# Patient Record
Sex: MALE | Race: WHITE | NOT HISPANIC OR LATINO | Employment: OTHER | URBAN - METROPOLITAN AREA
[De-identification: names, ages, dates, MRNs, and addresses within clinical notes are randomized per-mention and may not be internally consistent; named-entity substitution may affect disease eponyms.]

---

## 2017-01-11 ENCOUNTER — HOSPITAL ENCOUNTER (OUTPATIENT)
Dept: PHYSICAL THERAPY | Facility: CLINIC | Age: 67
Setting detail: THERAPIES SERIES
End: 2017-01-11
Attending: NURSE PRACTITIONER
Payer: COMMERCIAL

## 2017-01-11 PROCEDURE — 97110 THERAPEUTIC EXERCISES: CPT | Mod: GP | Performed by: PHYSICAL THERAPIST

## 2017-01-11 PROCEDURE — 40000718 ZZHC STATISTIC PT DEPARTMENT ORTHO VISIT: Performed by: PHYSICAL THERAPIST

## 2017-01-20 ENCOUNTER — HOSPITAL ENCOUNTER (OUTPATIENT)
Dept: PHYSICAL THERAPY | Facility: CLINIC | Age: 67
Setting detail: THERAPIES SERIES
End: 2017-01-20
Attending: NURSE PRACTITIONER
Payer: COMMERCIAL

## 2017-01-20 PROCEDURE — 97110 THERAPEUTIC EXERCISES: CPT | Mod: GP | Performed by: PHYSICAL THERAPIST

## 2017-01-20 PROCEDURE — 40000718 ZZHC STATISTIC PT DEPARTMENT ORTHO VISIT: Performed by: PHYSICAL THERAPIST

## 2017-02-23 NOTE — PROGRESS NOTES
Outpatient Physical Therapy Discharge Note     Patient: Magdaleno Dutton  : 1950    Beginning/End Dates of Reporting Period:  16 to 2017    Referring Provider: Anju Antonio CNP    Therapy Diagnosis: decreased strength in L shoulder assocated with fall/min RTC impingement     Client Self Report: Pt reports has really no changes in strength at home. he did not get much time to complete HEP as he through out the back this week. He has been cutting wood.     Objective Measurements:  Objective Measure: flex  Details: 160    Objective Measure: MMT  Details: GH flex: 5/5 abd:5/5 IR:5/5 ER: 4/5, elbow flex: 4+/5 - weaker on R than L     Objective Measure:  strength   Details: R: 33 L 25 kg               Goals:  Goal Identifier overhead motion   Goal Description Pt will demonstrate 180 deg GH AROM flex w less than 1/10 pain  to allow him to reach overhead  into cupboard to put dishes away without pain   Target Date 17   Date Met      Progress:not met     Goal Identifier lift   Goal Description Pt will be able to lift 50lbs from floor to counter x 5 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 Objective measures are all taken from last visit. Current status is unknown at this time. Pt has failed to schedule f/u visits within 30 days from last visit as instructed thus is being d/c from therapy  at this time        Plan:  Discharge from therapy.    Discharge:    Reason for Discharge: Patient has failed to schedule further appointments.    Equipment Issued: NA    Discharge Plan: Patient to continue home program.    Alida Triana  Physical Therapist  [facility]  [address] [place] [id number]  [email or web address]   [email or web address]   Office: [phone] Fax: [phone]

## 2017-10-02 ENCOUNTER — ALLIED HEALTH/NURSE VISIT (OUTPATIENT)
Dept: FAMILY MEDICINE | Facility: CLINIC | Age: 67
End: 2017-10-02
Payer: COMMERCIAL

## 2017-10-02 VITALS — DIASTOLIC BLOOD PRESSURE: 82 MMHG | SYSTOLIC BLOOD PRESSURE: 134 MMHG

## 2017-10-02 DIAGNOSIS — Z23 NEED FOR PROPHYLACTIC VACCINATION AND INOCULATION AGAINST INFLUENZA: Primary | ICD-10-CM

## 2017-10-02 PROCEDURE — G0008 ADMIN INFLUENZA VIRUS VAC: HCPCS

## 2017-10-02 PROCEDURE — 99207 ZZC NO CHARGE NURSE ONLY: CPT

## 2017-10-02 PROCEDURE — 90662 IIV NO PRSV INCREASED AG IM: CPT

## 2017-10-02 NOTE — MR AVS SNAPSHOT
"              After Visit Summary   10/2/2017    Magdaleno Dutton    MRN: [id number]           Patient Information     Date Of Birth          1950        Visit Information        Provider Department      10/2/2017 10:45 AM FL CASEY MORALES/LPN [facility]        Today's Diagnoses     Need for prophylactic vaccination and inoculation against influenza    -  1       Follow-ups after your visit        Who to contact     If you have questions or need follow up information about today's clinic visit or your schedule please contact [facility] directly at [phone].  Normal or non-critical lab and imaging results will be communicated to you by Rocketickhart, letter or phone within 4 business days after the clinic has received the results. If you do not hear from us within 7 days, please contact the clinic through Rocketickhart or phone. If you have a critical or abnormal lab result, we will notify you by phone as soon as possible.  Submit refill requests through Skedo or call your pharmacy and they will forward the refill request to us. Please allow 3 business days for your refill to be completed.          Additional Information About Your Visit        MyChart Information     Skedo lets you send messages to your doctor, view your test results, renew your prescriptions, schedule appointments and more. To sign up, go to [email or web address] . Click on \"Log in\" on the left side of the screen, which will take you to the Welcome page. Then click on \"Sign up Now\" on the right side of the page.     You will be asked to enter the access code listed below, as well as some personal information. Please follow the directions to create your username and password.     Your access code is: [email or web address]  Expires: 2017 12:09 PM     Your access code will  in 90 days. If you need help or a new code, please call your [facility] or [phone].        Care EveryWhere ID     This is your Care " EveryWhere ID. This could be used by other organizations to access your [facility] medical records  [id number]         Blood Pressure from Last 3 Encounters:   10/02/17 134/82   12/19/16 136/76   10/04/16 130/80    Weight from Last 3 Encounters:   12/19/16 194 lb 12.8 oz (88.4 kg)   10/04/16 197 lb (89.4 kg)   09/23/16 201 lb (91.2 kg)              We Performed the Following     ADMIN INFLUENZA (For MEDICARE Patients ONLY) []     FLU VACCINE, INCREASED ANTIGEN, PRESV FREE, AGE 65+ [41033]        Primary Care Provider Office Phone # Fax #    Anju Cabrera MADDIE Antonio [phone]       [facility] [address] [facility] [address]        Equal Access to Services     JEFFREY MCDOWELL : Hadii carolina davilao Soroger, waaxda luqadaha, qaybta kaalmada dlyan, eddie boykin . So [facility] [phone].    ATENCIÓN: Si habla español, tiene a schneider disposición servicios gratuitos de asistencia lingüística. Colin al [phone].    We comply with applicable federal civil rights laws and [place] laws. We do not discriminate on the basis of race, color, national origin, age, disability, sex, sexual orientation, or gender identity.            Thank you!     Thank you for choosing [facility]  for your care. Our goal is always to provide you with excellent care. Hearing back from our patients is one way we can continue to improve our services. Please take a few minutes to complete the written survey that you may receive in the mail after your visit with us. Thank you!             Your Updated Medication List - Protect others around you: Learn how to safely use, store and throw away your medicines at www.disposemymeds.org.          This list is accurate as of: 10/2/17 12:09 PM.  Always use your most recent med list.                   Brand Name Dispense Instructions for use Diagnosis    ASPIRIN PO      Take 81 mg by mouth        VITAMIN C PO            VITAMIN D PO

## 2017-10-02 NOTE — PROGRESS NOTES
Injectable Influenza Immunization Documentation    1.  Is the person to be vaccinated sick today?   No    2. Does the person to be vaccinated have an allergy to a component   of the vaccine?   No    3. Has the person to be vaccinated ever had a serious reaction   to influenza vaccine in the past?   No    4. Has the person to be vaccinated ever had Guillain-Barré syndrome?   No    Form completed by Nicolle Clemons CMA  Prior to injection verified patient identity using patient's name and date of birth.  Per orders of  , injection of flu given by Nicolle Clemons. Patient instructed to remain in clinic for 15 minutes afterwards, and to report any adverse reaction to me immediately.    Patient is also due for Prevnar 13.   States this is his first flu shot and did not want 2 injections today.  He will get the Prevnar in November when he returns for his annual PE

## 2017-10-16 ENCOUNTER — HOSPITAL ENCOUNTER (EMERGENCY)
Facility: CLINIC | Age: 67
Discharge: HOME | End: 2017-10-16
Attending: PHYSICIAN ASSISTANT | Admitting: PHYSICIAN ASSISTANT
Payer: COMMERCIAL

## 2017-10-16 VITALS
HEART RATE: 95 BPM | RESPIRATION RATE: 16 BRPM | DIASTOLIC BLOOD PRESSURE: 107 MMHG | WEIGHT: 195 LBS | OXYGEN SATURATION: 98 % | TEMPERATURE: 98.6 F | SYSTOLIC BLOOD PRESSURE: 161 MMHG

## 2017-10-16 DIAGNOSIS — S61.411A LACERATION OF RIGHT HAND WITHOUT FOREIGN BODY, INITIAL ENCOUNTER: ICD-10-CM
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
## 2017-10-16 PROCEDURE — 99282 EMERGENCY DEPT VISIT SF MDM: CPT | Mod: Z6 | Performed by: PHYSICIAN ASSISTANT

## 2017-10-16 PROCEDURE — 12001 RPR S/N/AX/GEN/TRNK 2.5CM/<: CPT | Performed by: PHYSICIAN ASSISTANT

## 2017-10-16 PROCEDURE — 99282 EMERGENCY DEPT VISIT SF MDM: CPT | Performed by: PHYSICIAN ASSISTANT

## 2017-10-16 PROCEDURE — 12001 RPR S/N/AX/GEN/TRNK 2.5CM/<: CPT | Mod: Z6 | Performed by: PHYSICIAN ASSISTANT

## 2017-10-16 RX ORDER — LIDOCAINE HYDROCHLORIDE AND EPINEPHRINE 10; 10 MG/ML; UG/ML
3 INJECTION, SOLUTION INFILTRATION; PERINEURAL ONCE
Status: DISCONTINUED | OUTPATIENT
Start: 2017-10-16 | End: 2017-10-16 | Stop reason: HOSPADM

## 2017-10-16 NOTE — ED AVS SNAPSHOT
[facility] Emergency Department    [address] [place] [address]    Phone:  [phone]    Fax:  [phone]                                       Magdaleno Dutton   MRN: [id number]    Department:  [facility] Emergency Department   Date of Visit:  10/16/2017           After Visit Summary Signature Page     I have received my discharge instructions, and my questions have been answered. I have discussed any challenges I see with this plan with the nurse or doctor.    ..........................................................................................................................................  Patient/Patient Representative Signature      ..........................................................................................................................................  Patient Representative Print Name and Relationship to Patient    ..................................................               ................................................  Date                                            Time    ..........................................................................................................................................  Reviewed by Signature/Title    ...................................................              ..............................................  Date                                                            Time

## 2017-10-16 NOTE — ED PROVIDER NOTES
History     Chief Complaint   Patient presents with     Laceration     HPI  Magdaleno Dutton is a 67 year old male who presents for a laceration to the right dorsal hand that occurred about one hour ago. He was working with a crane when a sharp piece came down and struck the top of his hand. She had immediate onset of bleeding. He does not feel that there is any significant trauma to the hand that could lead to possible fracture. He does not have any significant hand pain, but states that he had a lot of bleeding. Therefore, he presented for evaluation. He states that he had a tetanus booster one year prior in a different facility. Denies any problems with range of motion or sensation of the hand at this time.    I have reviewed the Medications, Allergies, Past Medical and Surgical History, and Social History in the Epic system.    Allergies: No Known Allergies      No current facility-administered medications on file prior to encounter.   Current Outpatient Prescriptions on File Prior to Encounter:  ASPIRIN PO Take 81 mg by mouth   Ascorbic Acid (VITAMIN C PO)    Cholecalciferol (VITAMIN D PO)        Patient Active Problem List   Diagnosis     Hyperlipidemia LDL goal <160     Advanced directives, counseling/discussion       History reviewed. No pertinent surgical history.    Social History   Substance Use Topics     Smoking status: Former Smoker     Quit date: 1/1/2001     Smokeless tobacco: Never Used     Alcohol use Yes      Comment: 4-5 beers at night        Most Recent Immunizations   Administered Date(s) Administered     Influenza (High Dose) 3 valent vaccine 10/02/2017     TDAP Vaccine (Boostrix) 12/19/2016       BMI: There is no height or weight on file to calculate BMI.      Review of Systems   All other systems reviewed and are negative.      Physical Exam   BP: (!) 161/107  Pulse: 95  Temp: 98.6  F (37  C)  Resp: 16  Weight: 88.5 kg (195 lb)  SpO2: 98 %       Physical Exam  Generally healthy appearing  male in NAD who is active and non-toxic appearing.   Right dorsal mid hand with a 2 cm transverse superficial laceration with no significant bleeding at this time. No tendon involvement. Normal range of motion of hand. Sensation intact to light touch. Cap refill less than 2 seconds.    ED Course     ED Course     Laceration repair  Date/Time: 10/16/2017 3:46 PM  Performed by: CARI COBIAN  Authorized by: CARI COBIAN   Consent: Verbal consent obtained.  Risks and benefits: risks, benefits and alternatives were discussed  Consent given by: patient  Patient understanding: patient states understanding of the procedure being performed  Patient identity confirmed: verbally with patient and arm band  Body area: upper extremity  Location details: right hand  Laceration length: 2 cm  Foreign bodies: no foreign bodies  Tendon involvement: none  Nerve involvement: none  Vascular damage: no  Anesthesia: local infiltration    Anesthesia:  Local Anesthetic: lidocaine 1% with epinephrine  Anesthetic total: 3 mL    Sedation:  Patient sedated: no  Preparation: Patient was prepped and draped in the usual sterile fashion.  Irrigation solution: saline  Irrigation method: syringe  Amount of cleaning: standard  Debridement: none  Degree of undermining: none  Skin closure: 4-0 nylon  Number of sutures: 3  Technique: simple  Approximation: close  Approximation difficulty: simple  Dressing: antibiotic ointment (Adhesive bandage.)  Patient tolerance: Patient tolerated the procedure well with no immediate complications                     Critical Care time:  none               Labs Ordered and Resulted from Time of ED Arrival Up to the Time of Departure from the ED - No data to display    Assessments & Plan (with Medical Decision Making)  Laceration of right hand without foreign body, initial encounter     67 year old male presents for evaluation of a laceration to his right dorsal hand without tendon or nerve involvement. A  2 cm laceration was repaired in a standard fashion with 3 4-0 Ethilon sutures. Wound care measures discussed with him. Tetanus status was up-to-date. Return to his home clinic in 7 days for suture removal. The patient was in agreement with this plan and was suitable for discharge.      I have reviewed the nursing notes.    I have reviewed the findings, diagnosis, plan and need for follow up with the patient.       New Prescriptions    No medications on file       Final diagnoses:   Laceration of right hand without foreign body, initial encounter       Disclaimer: This note consists of symbols derived from keyboarding, dictation and/or voice recognition software. As a result, there may be errors in the script that have gone undetected. Please consider this when interpreting information found in this chart.    10/16/2017   Dave Mazariegos PA-C   [facility] EMERGENCY DEPARTMENT     Dave Mazariegos PA-C  10/16/17 1548

## 2017-10-16 NOTE — ED NOTES
Wound is covered with band aid at this time. Discharge reviewed with patient. Patient declined offer to recheck blood pressure.

## 2017-10-16 NOTE — DISCHARGE INSTRUCTIONS
* LACERATION (All Closures)  A laceration is a cut through the skin. This will usually require stitches (sutures) or staples if it is deep. Minor cuts may be treated with a tape closure ( Steri-Strips ) or Dermabond skin glue.       HOME CARE:  PAIN MEDICINE: You may use acetaminophen (Tylenol) 650-1000 mg every 6 hours or ibuprofen (Motrin, Advil) 600 mg every 6-8 hours with food to control pain, if you are able to take these medicines. [NOTE: If you have chronic liver or kidney disease or ever had a stomach ulcer or GI bleeding, talk with your doctor before using these medicines.]  EXTREMITY, FACE or TRUNK WOUNDS:    Keep the wound clean and dry. If a bandage was applied and it becomes wet or dirty, replace it. Otherwise, leave it in place for the first 24 hours.    If stitches or staples were used, clean the wound daily. Protect the wound from sunlight and tanning lamps.    After removing the bandage, wash the area with soap and water. Use a wet cotton swab (Q tip) to loosen and remove any blood or crust that forms.    After cleaning, apply a thin layer of Polysporin or Bacitracin ointment. This will keep the wound clean and make it easier to remove the stitches or staples. Reapply a fresh bandage.    You may remove the bandage to shower as usual after the first 24 hours, but do not soak the area in water (no swimming) until the stitches or staples are removed.    If Steri-Strips were used, keep the area clean and dry. If it becomes wet, blot it dry with a towel. It is okay to take a brief shower, but avoid scrubbing the area.    If Dermabond skin adhesive was used, do not scratch, rub or pick at the adhesive film. Do not place tape directly over the film. Do not apply liquid, ointment or creams to the wound while the film is in place. Do not clean the wound with peroxide and do not apply ointments. Avoid activities that cause heavy sweating until the film has fallen off. Protect the wound from prolonged  exposure to sunlight or tanning lamps. You may shower as usual but do not soak the wound in water (no baths or swimming). The film will fall off by itself in 5-10 days.  SCALP WOUNDS: During the first two days, you may carefully rinse your hair in the shower to remove blood, glass or dirt particles. After two days, you may shower and shampoo your hair normally. Do not soak your scalp in the tub or go swimming until the stitches or staples have been removed.  MOUTH WOUNDS: Eat soft foods to reduce pain. If the cut is inside of your mouth, clean by rinsing after each meal and at bedtime with a mixture of equal parts water and Hydrogen Peroxide (do not swallow!). Or, you can use a cotton swab to directly apply Hydrogen Peroxide onto the cut.  After the wound is done healing, use sunscreen over the area whenever exposed for the next 6 minths to avoid a darker scar.     FOLLOW UP: Most skin wounds heal within ten days. Mouth and facial wounds heal within five days. However, even with proper treatment, a wound infection may sometimes occur. Therefore, you should check the wound daily for signs of infection listed below.  Stitches should be removed from the face within five days; stitches and staples should be removed from other parts of the body within 7-10 days. Unless you are told to come back to the emergency room, you may have your doctor or urgent care remove the stitches. If dissolving stitches were used in the mouth, these will fall out or dissolve without the need for removal. If tape closures ( Steri-Strips ) were used, remove them yourself if they have not fallen off after 7 days. If Dermabond skin glue was used, the film will fall off by itself in 5-10 days.      GET PROMPT MEDICAL ATTENTION  if any of the following occur:    Increasing pain in the wound    Redness, swelling or pus coming from the wound    Fever over 101 F (38.3 C) oral    If stitches or staples come apart or fall out or if Steri-Strips fall  off before seven days    If the wound edges re-open    Bleeding not controlled by direct pressure    [id number] [employer], [address] [place] [address], [place] [address]. All rights reserved. This information is not intended as a substitute for professional medical care. Always follow your healthcare professional's instructions.

## 2017-10-16 NOTE — ED AVS SNAPSHOT
[facility] Emergency Department    [address]     [place] [address]    Phone:  [phone]    Fax:  [phone]                                       Magdaleno Dutton   MRN: [id number]    Department:  [facility] Emergency Department   Date of Visit:  10/16/2017           Patient Information     Date Of Birth          1950        Your diagnoses for this visit were:     Laceration of right hand without foreign body, initial encounter        You were seen by Dave Mazariegos PA-C.      Follow-up Information     Follow up with Anju Antonio APRN CNP. Schedule an appointment as soon as possible for a visit in 7 days.    Specialty:  Nurse Practitioner - Family    Why:  For suture removal    Contact information:    [facility]  [address] [place] [address]  [phone]          Discharge Instructions          * LACERATION (All Closures)  A laceration is a cut through the skin. This will usually require stitches (sutures) or staples if it is deep. Minor cuts may be treated with a tape closure ( Steri-Strips ) or Dermabond skin glue.       HOME CARE:  PAIN MEDICINE: You may use acetaminophen (Tylenol) 650-1000 mg every 6 hours or ibuprofen (Motrin, Advil) 600 mg every 6-8 hours with food to control pain, if you are able to take these medicines. [NOTE: If you have chronic liver or kidney disease or ever had a stomach ulcer or GI bleeding, talk with your doctor before using these medicines.]  EXTREMITY, FACE or TRUNK WOUNDS:    Keep the wound clean and dry. If a bandage was applied and it becomes wet or dirty, replace it. Otherwise, leave it in place for the first 24 hours.    If stitches or staples were used, clean the wound daily. Protect the wound from sunlight and tanning lamps.    After removing the bandage, wash the area with soap and water. Use a wet cotton swab (Q tip) to loosen and remove any blood or crust that forms.    After cleaning, apply  a thin layer of Polysporin or Bacitracin ointment. This will keep the wound clean and make it easier to remove the stitches or staples. Reapply a fresh bandage.    You may remove the bandage to shower as usual after the first 24 hours, but do not soak the area in water (no swimming) until the stitches or staples are removed.    If Steri-Strips were used, keep the area clean and dry. If it becomes wet, blot it dry with a towel. It is okay to take a brief shower, but avoid scrubbing the area.    If Dermabond skin adhesive was used, do not scratch, rub or pick at the adhesive film. Do not place tape directly over the film. Do not apply liquid, ointment or creams to the wound while the film is in place. Do not clean the wound with peroxide and do not apply ointments. Avoid activities that cause heavy sweating until the film has fallen off. Protect the wound from prolonged exposure to sunlight or tanning lamps. You may shower as usual but do not soak the wound in water (no baths or swimming). The film will fall off by itself in 5-10 days.  SCALP WOUNDS: During the first two days, you may carefully rinse your hair in the shower to remove blood, glass or dirt particles. After two days, you may shower and shampoo your hair normally. Do not soak your scalp in the tub or go swimming until the stitches or staples have been removed.  MOUTH WOUNDS: Eat soft foods to reduce pain. If the cut is inside of your mouth, clean by rinsing after each meal and at bedtime with a mixture of equal parts water and Hydrogen Peroxide (do not swallow!). Or, you can use a cotton swab to directly apply Hydrogen Peroxide onto the cut.  After the wound is done healing, use sunscreen over the area whenever exposed for the next 6 minths to avoid a darker scar.     FOLLOW UP: Most skin wounds heal within ten days. Mouth and facial wounds heal within five days. However, even with proper treatment, a wound infection may sometimes occur. Therefore, you  should check the wound daily for signs of infection listed below.  Stitches should be removed from the face within five days; stitches and staples should be removed from other parts of the body within 7-10 days. Unless you are told to come back to the emergency room, you may have your doctor or urgent care remove the stitches. If dissolving stitches were used in the mouth, these will fall out or dissolve without the need for removal. If tape closures ( Steri-Strips ) were used, remove them yourself if they have not fallen off after 7 days. If Dermabond skin glue was used, the film will fall off by itself in 5-10 days.      GET PROMPT MEDICAL ATTENTION  if any of the following occur:    Increasing pain in the wound    Redness, swelling or pus coming from the wound    Fever over 101 F (38.3 C) oral    If stitches or staples come apart or fall out or if Steri-Strips fall off before seven days    If the wound edges re-open    Bleeding not controlled by direct pressure    6119[address]48[address] [place] [address]. All rights reserved. This information is not intended as a substitute for professional medical care. Always follow your healthcare professional's instructions.      24 Hour Appointment Hotline       To make an appointment at any [facility], call [phone] ([phone]). If you don't have a family doctor or clinic, we will help you find one. [facility] clinics are conveniently located to serve the needs of you and your family.             Review of your medicines      Our records show that you are taking the medicines listed below. If these are incorrect, please call your family doctor or clinic.        Dose / Directions Last dose taken    ASPIRIN PO   Dose:  81 mg        Take 81 mg by mouth   Refills:  0        VITAMIN C PO        Refills:  0        VITAMIN D PO        Refills:  0                Procedures and tests performed during your visit     Laceration repair     "  Orders Needing Specimen Collection     None      Pending Results     No orders found from 10/14/2017 to 10/17/2017.            Pending Culture Results     No orders found from 10/14/2017 to 10/17/2017.            Pending Results Instructions     If you had any lab results that were not finalized at the time of your Discharge, you can call the ED Lab Result RN at [phone]. You will be contacted by this team for any positive Lab results or changes in treatment. The nurses are available 7 days a week from 10A to 6:30P.  You can leave a message 24 hours per day and they will return your call.        Thank you for choosing [facility]       Thank you for choosing [facility] for your care. Our goal is always to provide you with excellent care. Hearing back from our patients is one way we can continue to improve our services. Please take a few minutes to complete the written survey that you may receive in the mail after you visit with us. Thank you!        KlosetshopharSecureLink Information     [employer] lets you send messages to your doctor, view your test results, renew your prescriptions, schedule appointments and more. To sign up, go to [email or web address] . Click on \"Log in\" on the left side of the screen, which will take you to the Welcome page. Then click on \"Sign up Now\" on the right side of the page.     You will be asked to enter the access code listed below, as well as some personal information. Please follow the directions to create your username and password.     Your access code is: [email or web address]  Expires: 2017 12:09 PM     Your access code will  in 90 days. If you need help or a new code, please call your [facility] clinic or [phone].        Care EveryWhere ID     This is your Care EveryWhere ID. This could be used by other organizations to access your [facility] medical records  [id number]        Equal Access to Services     JEFFREY INTERIANO: Armin Simons, mary khanna, sosa capone " eddie richardson ah. So [facility] [phone].    ATENCIÓN: Si habla español, tiene a schneider disposición servicios gratuitos de asistencia lingüística. Llame al [phone].    We comply with applicable federal civil rights laws and [place] laws. We do not discriminate on the basis of race, color, national origin, age, disability, sex, sexual orientation, or gender identity.            After Visit Summary       This is your record. Keep this with you and show to your community pharmacist(s) and doctor(s) at your next visit.

## 2017-10-23 ENCOUNTER — ALLIED HEALTH/NURSE VISIT (OUTPATIENT)
Dept: FAMILY MEDICINE | Facility: CLINIC | Age: 67
End: 2017-10-23
Payer: COMMERCIAL

## 2017-10-23 DIAGNOSIS — Z48.02 ENCOUNTER FOR REMOVAL OF SUTURES: Primary | ICD-10-CM

## 2017-10-23 PROCEDURE — 99207 ZZC NO CHARGE NURSE ONLY: CPT

## 2017-10-23 NOTE — NURSING NOTE
Magdaleno presents to the clinic today for  removal of sutures.  The patient has had the sutures in place for 7 days.    There has been no history of infection or drainage.    O: 3 sutures are seen located on the right hand - top.  The wound is healing well with no signs of infection.    Tetanus status is up to date.    A: Suture removal.    P:  All sutures were easily removed today.  Routine wound care discussed.  The patient will follow up as needed.  Closing this encounter.  Alida Coates RN

## 2017-10-23 NOTE — MR AVS SNAPSHOT
"              After Visit Summary   10/23/2017    Magdaleno Dutton    MRN: [id number]           Patient Information     Date Of Birth          1950        Visit Information        Provider Department      10/23/2017 9:00 AM NL RN TEAM A, [facility]        Today's Diagnoses     Encounter for removal of sutures    -  1       Follow-ups after your visit        Who to contact     If you have questions or need follow up information about today's clinic visit or your schedule please contact [facility] directly at [phone].  Normal or non-critical lab and imaging results will be communicated to you by My Visual Briefhart, letter or phone within 4 business days after the clinic has received the results. If you do not hear from us within 7 days, please contact the clinic through My Visual Briefhart or phone. If you have a critical or abnormal lab result, we will notify you by phone as soon as possible.  Submit refill requests through [employer] or call your pharmacy and they will forward the refill request to us. Please allow 3 business days for your refill to be completed.          Additional Information About Your Visit        MyChart Information     [employer] lets you send messages to your doctor, view your test results, renew your prescriptions, schedule appointments and more. To sign up, go to [email or web address] . Click on \"Log in\" on the left side of the screen, which will take you to the Welcome page. Then click on \"Sign up Now\" on the right side of the page.     You will be asked to enter the access code listed below, as well as some personal information. Please follow the directions to create your username and password.     Your access code is: [email or web address]  Expires: 2017 12:09 PM     Your access code will  in 90 days. If you need help or a new code, please call your [facility] or [phone].        Care EveryWhere ID     This is your Care EveryWhere ID. This could be used " by other organizations to access your [facility] medical records  [id number]         Blood Pressure from Last 3 Encounters:   10/16/17 (!) 161/107   10/02/17 134/82   12/19/16 136/76    Weight from Last 3 Encounters:   10/16/17 195 lb (88.5 kg)   12/19/16 194 lb 12.8 oz (88.4 kg)   10/04/16 197 lb (89.4 kg)              Today, you had the following     No orders found for display       Primary Care Provider Office Phone # Fax #    Anju Antonio, APRN [facility] [phone]       [facility] [address] [facility] [address]        Equal Access to Services     JEFFREY MCDOWELL : Hadii carolina davilao Soroger, waaxda luqadaha, qaybta kaalmada adeegyada, eddie paniagua. So [facility] [phone].    ATENCIÓN: Si habla español, tiene a schneider disposición servicios gratuitos de asistencia lingüística. Llame al [phone].    We comply with applicable federal civil rights laws and [place] laws. We do not discriminate on the basis of race, color, national origin, age, disability, sex, sexual orientation, or gender identity.            Thank you!     Thank you for choosing [facility]  for your care. Our goal is always to provide you with excellent care. Hearing back from our patients is one way we can continue to improve our services. Please take a few minutes to complete the written survey that you may receive in the mail after your visit with us. Thank you!             Your Updated Medication List - Protect others around you: Learn how to safely use, store and throw away your medicines at www.disposemymeds.org.          This list is accurate as of: 10/23/17 10:17 AM.  Always use your most recent med list.                   Brand Name Dispense Instructions for use Diagnosis    ASPIRIN PO      Take 81 mg by mouth        VITAMIN C PO           VITAMIN D PO

## 2017-12-06 ENCOUNTER — RADIANT APPOINTMENT (OUTPATIENT)
Dept: GENERAL RADIOLOGY | Facility: CLINIC | Age: 67
End: 2017-12-06
Attending: NURSE PRACTITIONER
Payer: COMMERCIAL

## 2017-12-06 ENCOUNTER — OFFICE VISIT (OUTPATIENT)
Dept: FAMILY MEDICINE | Facility: CLINIC | Age: 67
End: 2017-12-06
Payer: COMMERCIAL

## 2017-12-06 VITALS
HEART RATE: 76 BPM | TEMPERATURE: 97.2 F | RESPIRATION RATE: 20 BRPM | SYSTOLIC BLOOD PRESSURE: 132 MMHG | DIASTOLIC BLOOD PRESSURE: 84 MMHG | WEIGHT: 194.4 LBS

## 2017-12-06 DIAGNOSIS — Z23 NEED FOR PNEUMOCOCCAL VACCINE: ICD-10-CM

## 2017-12-06 DIAGNOSIS — M79.671 PAIN OF RIGHT HEEL: ICD-10-CM

## 2017-12-06 DIAGNOSIS — M72.2 PLANTAR FASCIITIS OF RIGHT FOOT: Primary | ICD-10-CM

## 2017-12-06 PROCEDURE — 99213 OFFICE O/P EST LOW 20 MIN: CPT | Mod: 25 | Performed by: NURSE PRACTITIONER

## 2017-12-06 PROCEDURE — G0009 ADMIN PNEUMOCOCCAL VACCINE: HCPCS | Performed by: NURSE PRACTITIONER

## 2017-12-06 PROCEDURE — 73630 X-RAY EXAM OF FOOT: CPT | Mod: RT

## 2017-12-06 PROCEDURE — 90670 PCV13 VACCINE IM: CPT | Performed by: NURSE PRACTITIONER

## 2017-12-06 NOTE — MR AVS SNAPSHOT
After Visit Summary   12/6/2017    Magdaleno Dutton    MRN: [id number]           Patient Information     Date Of Birth          1950        Visit Information        Provider Department      12/6/2017 9:20 AM Anju Antonio APRN [facility]        Today's Diagnoses     Plantar fasciitis of right foot    -  1    Pain of right heel          Care Instructions    At this time for the plantar fasciitis, continue icing, stretching, tissue massage and support.     Plantar Fasciitis:       * Good walking shoes or running shoes that would be appropriate for your activities.       * Try to minimize the pounding on your feet.  Less high impact and more low.  Minimize quick/accelerating movements (jumping or running fast). Biking is a good form of exercise.     * Stretching twice daily.  30-45 seconds with each stretch.     1-Calve (knee straight)  2-Lower calve (knee bent)  3-Plantar fascia    * Ice if possible after activity.    * Warm-ups in bed before getting up.    * Posterior night splint.    Return to clinic if symptoms not improving after a month and may fit with a dynaflex foot pad.     Understanding Plantar Fasciitis    Plantar fasciitis is a condition that causes foot and heel pain. The plantar fascia is a tough band of tissue that runs across the bottom of the foot from the heel to the toes. This tissue pulls on the heel bone. It supports the arch of the foot as it pushes off the ground. If the tissue becomes irritated or red and swollen (inflamed), it is called plantar fasciitis.  How to say it  PLAN-tuhr fa-see-IY-tis   What causes plantar fasciitis?  Plantar fasciitis most often occurs from overusing the plantar fascia. The tissue may become damaged from activities that put repeated stress on the heel and foot. Or it may wear down over time with age and ankle stiffness. You are more likely to have plantar fasciitis if you:    Do activities that require a lot of  running, jumping, or dancing    Have a job that requires being on your feet for long periods    Are overweight or obese    Have certain foot problems, such as a tight Achilles tendon, flat feet, or high arches    Often wear poorly fitting shoes  Symptoms of plantar fasciitis  The condition most often causes pain in the heel and the bottom of the foot. The pain may occur when you take your first steps in the morning. It may get better as you walk throughout the day. But as you continue to put weight on the foot, the pain often returns. Pain may also occur after standing or sitting for long periods.  Treating plantar fasciitis  Treatments for plantar fasciitis include:    Resting the foot. This involves limiting movements that make your foot hurt. You may also need to avoid certain sports and types of work for a time.    Using cold packs. Put an ice pack on the heel and foot to help reduce pain and swelling.    Taking pain medicines. Prescription and over-the-counter pain medicines can help relieve pain and swelling.    Using heel cups or foot inserts (orthotics). These are placed in the shoes to help support the heel or arch and cushion the heel. You may also be told to buy proper-fitting shoes with good arch support and cushioned soles.    Taping the foot. This supports the arch and limits the movement of the plantar fascia to help relieve symptoms.    Wearing a night splint. This stretches the plantar fascia and leg muscles while you sleep. This may help relieve pain.    Doing exercises and physical therapy. These stretch and strengthen the plantar fascia and the muscles in the leg that support the heel and foot.    Getting shots of medicine into the foot. These may help relieve symptoms for a time.    Having surgery. This may be needed if other treatments fail to relieve symptoms. During surgery, the surgeon may partially cut the plantar fascia to release tension.  Possible complications of plantar  fasciitis  Without proper care and treatment, healing may take longer than normal. Also, symptoms may continue or get worse. Over time, the plantar fascia may be damaged. This can make it hard to walk or even stand without pain.  When to call your healthcare provider  Call your healthcare provider right away if you have any of these:    Fever of 100.4 F (38 C) or higher, or as directed    Symptoms that don t get better with treatment, or get worse    New symptoms, such as numbness, tingling, or weakness in the foot   Date Last Reviewed: 3/10/2016    6096-7336 [employer]. [address] [place], [place] [address]. All rights reserved. This information is not intended as a substitute for professional medical care. Always follow your healthcare professional's instructions.        Treating Plantar Fasciitis  First, your healthcare provider tries to determine the cause of your problem in order to suggest ways to relieve pain. If your pain is due to poor foot mechanics, custom-made shoe inserts (orthoses) may help.    Reduce symptoms    To relieve mild symptoms, try aspirin, ibuprofen, or other medicines as directed. Rubbing ice on the affected area may also help.    To reduce severe pain and swelling, your healthcare provider may prescribe pills or injections or a walking cast in some instances. Physical therapy, such as ultrasound or a daily stretching program, may also be recommended. Surgery is rarely required.    To reduce symptoms caused by poor foot mechanics, your foot may be taped. This supports the arch and temporarily controls movement. Night splints may also help by stretching the fascia.  Control movement  If taping helps, your healthcare provider may prescribe orthoses. Built from plaster casts of your feet, these inserts control the way your foot moves. As a result, your symptoms should go away.  Reduce overuse  Every time your foot strikes the ground, the plantar fascia is stretched. You can  "reduce the strain on the plantar fascia and the possibility of overuse by following these suggestions:    Lose any excess weight.    Avoid running on hard or uneven ground.    Use orthoses at all times in your shoes and house slippers.  If surgery is needed  Your healthcare provider may consider surgery if other types of treatment don't control your pain. During surgery, the plantar fascia is partially cut to release tension. As you heal, fibrous tissue fills the space between the heel bone and the plantar fascia.   Date Last Reviewed: 10/14/2015    2665-0793 [employer]. [address] [place] [address]. All rights reserved. This information is not intended as a substitute for professional medical care. Always follow your healthcare professional's instructions.                Follow-ups after your visit        Who to contact     If you have questions or need follow up information about today's clinic visit or your schedule please contact [facility] directly at [phone].  Normal or non-critical lab and imaging results will be communicated to you by MyChart, letter or phone within 4 business days after the clinic has received the results. If you do not hear from us within 7 days, please contact the clinic through [employer] The[employer]hart or phone. If you have a critical or abnormal lab result, we will notify you by phone as soon as possible.  Submit refill requests through [employer]oFree or call your pharmacy and they will forward the refill request to us. Please allow 3 business days for your refill to be completed.          Additional Information About Your Visit        [employer] Therapeutics[employer]Eduson Information     MoboFree lets you send messages to your doctor, view your test results, renew your prescriptions, schedule appointments and more. To sign up, go to [email or web address] . Click on \"Log in\" on the left side of the screen, which will take you to the Welcome page. Then click on \"Sign up Now\" on the right " side of the page.     You will be asked to enter the access code listed below, as well as some personal information. Please follow the directions to create your username and password.     Your access code is: [email or web address]  Expires: 2017 11:09 AM     Your access code will  in 90 days. If you need help or a new code, please call your [facility] clinic or [phone].        Care EveryWhere ID     This is your Care EveryWhere ID. This could be used by other organizations to access your [facility] medical records  [id number]        Your Vitals Were     Pulse Temperature Respirations             76 97.2  F (36.2  C) (Tympanic) 20          Blood Pressure from Last 3 Encounters:   17 132/84   10/16/17 (!) 161/107   10/02/17 134/82    Weight from Last 3 Encounters:   17 194 lb 6.4 oz (88.2 kg)   10/16/17 195 lb (88.5 kg)   16 194 lb 12.8 oz (88.4 kg)               Primary Care Provider Office Phone # Fax #    Anju MADDIE Albrecht [facility] [phone]       [facility] [address] [place] [address]        Equal Access to Services     JEFFREY MCDOWELL : Hadii carolina ku hadasho Soomaali, waaxda luqadaha, qaybta kaalmada adeegyada, waxay idiin hayarcelia paniagua. So [facility] [phone].    ATENCIÓN: Si habla español, tiene a schneider disposición servicios gratuitos de asistencia lingüística. Llame al [phone].    We comply with applicable federal civil rights laws and [place] laws. We do not discriminate on the basis of race, color, national origin, age, disability, sex, sexual orientation, or gender identity.            Thank you!     Thank you for choosing [facility]  for your care. Our goal is always to provide you with excellent care. Hearing back from our patients is one way we can continue to improve our services. Please take a few minutes to complete the written survey that you may receive in the mail after your visit with us. Thank  you!             Your Updated Medication List - Protect others around you: Learn how to safely use, store and throw away your medicines at www.disposemymeds.org.          This list is accurate as of: 12/6/17 10:19 AM.  Always use your most recent med list.                   Brand Name Dispense Instructions for use Diagnosis    ASPIRIN PO      Take 81 mg by mouth        VITAMIN C PO           VITAMIN D PO

## 2017-12-06 NOTE — PROGRESS NOTES
SUBJECTIVE:   Magdaleno Dutton is a 67 year old male who presents to clinic today for the following health issues:      Joint Pain    Onset: x a couple of weeks     Description:   Location: right foot  Character: Sharp    Intensity: mild, moderate, some days can be severe depending on what he's doing    Progression of Symptoms: depends on the day    Accompanying Signs & Symptoms:  Other symptoms: none    History:   Previous similar pain: no       Precipitating factors:   Trauma or overuse: no     Alleviating factors:  Improved by: support wrap, ibuprofen    Therapies Tried and outcome: Ibuprofen - temporary relief    No injury or trauma that he can remember, 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  Additional history: as documented    Patient Active Problem List   Diagnosis     Hyperlipidemia LDL goal <160     Advanced directives, counseling/discussion     History reviewed. No pertinent surgical history.    Social History   Substance Use Topics     Smoking status: Former Smoker     Quit date: 1/1/2001     Smokeless tobacco: Never Used     Alcohol use Yes      Comment: 4-5 beers at night      Family History   Problem Relation Age of Onset     CANCER Father      DIABETES Sister      DIABETES Sister          Current Outpatient Prescriptions   Medication Sig Dispense Refill     ASPIRIN PO Take 81 mg by mouth       Ascorbic Acid (VITAMIN C PO)        Cholecalciferol (VITAMIN D PO)        No Known Allergies      Reviewed and updated as needed this visit by clinical staffTobacco  Allergies  Meds  Problems  Med Hx  Surg Hx  Fam Hx  Soc Hx        Reviewed and updated as needed this visit by Provider  Tobacco  Allergies  Meds  Problems  Med Hx  Surg Hx  Fam Hx  Soc Hx          ROS:  Constitutional, HEENT, cardiovascular, pulmonary, gi and gu systems are negative,  except as otherwise noted.      OBJECTIVE:   /84 (BP Location: Right arm, Patient Position: Chair, Cuff Size: Adult Large)  Pulse 76  Temp 97.2  F (36.2  C) (Tympanic)  Resp 20  Wt 194 lb 6.4 oz (88.2 kg)  There is no height or weight on file to calculate BMI.  GENERAL APPEARANCE: healthy, alert and no distress  MS: extremities normal- no gross deformities noted, peripheral pulses normal and tenderness on palpation over the medial band of plantar fascia of right foot without swelling, erythema or warmth     Diagnostic Test Results:  Xray - Right foot independently read by MADDIE Watson CNP - notes no acute abnormality, fractures or spurring - await final radiologist read    ASSESSMENT/PLAN:     1. Plantar fasciitis of right foot  Patient has sharp pain on the bottom of right foot and heal intermittently for approximately 2 weeks. No pain in heel on palpation, however pain along plantar fascia consistent with plantar fasciitis. Pain is tolerable for patient and is not constant. Discussed conservative management with patient and to return to clinic if symptoms not improved.     2. Pain of right heel  Xray negative for any fractures or significant spurring causing pain  - XR Foot Right G/E 3 Views; Future    3. Need for pneumococcal vaccine    - Pneumococcal vaccine 13 valent PCV13 IM (Prevnar) [[id number]]  - ADMIN PNEUMOVAX VACCINE (For MEDICARE Patients ONLY) []    Patient Instructions     At this time for the plantar fasciitis, continue icing, stretching, tissue massage and support.     Plantar Fasciitis:       * Good walking shoes or running shoes that would be appropriate for your activities.       * Try to minimize the pounding on your feet.  Less high impact and more low.  Minimize quick/accelerating movements (jumping or running fast). Biking is a good form of exercise.     * Stretching twice daily.  30-45 seconds with each stretch.     1-Calve (knee straight)  2-Lower calve (knee  bent)  3-Plantar fascia    * Ice if possible after activity.    * Warm-ups in bed before getting up.    * Posterior night splint.    Return to clinic if symptoms not improving after a month and may fit with a dynaflex foot pad.     Understanding Plantar Fasciitis    Plantar fasciitis is a condition that causes foot and heel pain. The plantar fascia is a tough band of tissue that runs across the bottom of the foot from the heel to the toes. This tissue pulls on the heel bone. It supports the arch of the foot as it pushes off the ground. If the tissue becomes irritated or red and swollen (inflamed), it is called plantar fasciitis.  How to say it  PLAN-tuhr fa-see-IY-tis   What causes plantar fasciitis?  Plantar fasciitis most often occurs from overusing the plantar fascia. The tissue may become damaged from activities that put repeated stress on the heel and foot. Or it may wear down over time with age and ankle stiffness. You are more likely to have plantar fasciitis if you:    Do activities that require a lot of running, jumping, or dancing    Have a job that requires being on your feet for long periods    Are overweight or obese    Have certain foot problems, such as a tight Achilles tendon, flat feet, or high arches    Often wear poorly fitting shoes  Symptoms of plantar fasciitis  The condition most often causes pain in the heel and the bottom of the foot. The pain may occur when you take your first steps in the morning. It may get better as you walk throughout the day. But as you continue to put weight on the foot, the pain often returns. Pain may also occur after standing or sitting for long periods.  Treating plantar fasciitis  Treatments for plantar fasciitis include:    Resting the foot. This involves limiting movements that make your foot hurt. You may also need to avoid certain sports and types of work for a time.    Using cold packs. Put an ice pack on the heel and foot to help reduce pain and  swelling.    Taking pain medicines. Prescription and over-the-counter pain medicines can help relieve pain and swelling.    Using heel cups or foot inserts (orthotics). These are placed in the shoes to help support the heel or arch and cushion the heel. You may also be told to buy proper-fitting shoes with good arch support and cushioned soles.    Taping the foot. This supports the arch and limits the movement of the plantar fascia to help relieve symptoms.    Wearing a night splint. This stretches the plantar fascia and leg muscles while you sleep. This may help relieve pain.    Doing exercises and physical therapy. These stretch and strengthen the plantar fascia and the muscles in the leg that support the heel and foot.    Getting shots of medicine into the foot. These may help relieve symptoms for a time.    Having surgery. This may be needed if other treatments fail to relieve symptoms. During surgery, the surgeon may partially cut the plantar fascia to release tension.  Possible complications of plantar fasciitis  Without proper care and treatment, healing may take longer than normal. Also, symptoms may continue or get worse. Over time, the plantar fascia may be damaged. This can make it hard to walk or even stand without pain.  When to call your healthcare provider  Call your healthcare provider right away if you have any of these:    Fever of 100.4 F (38 C) or higher, or as directed    Symptoms that don t get better with treatment, or get worse    New symptoms, such as numbness, tingling, or weakness in the foot   Date Last Reviewed: 3/10/2016    6125-2012 [employer]. [address] [place] [address], [place] [address]. All rights reserved. This information is not intended as a substitute for professional medical care. Always follow your healthcare professional's instructions.        Treating Plantar Fasciitis  First, your healthcare provider tries to determine the cause of your problem in order to  suggest ways to relieve pain. If your pain is due to poor foot mechanics, custom-made shoe inserts (orthoses) may help.    Reduce symptoms    To relieve mild symptoms, try aspirin, ibuprofen, or other medicines as directed. Rubbing ice on the affected area may also help.    To reduce severe pain and swelling, your healthcare provider may prescribe pills or injections or a walking cast in some instances. Physical therapy, such as ultrasound or a daily stretching program, may also be recommended. Surgery is rarely required.    To reduce symptoms caused by poor foot mechanics, your foot may be taped. This supports the arch and temporarily controls movement. Night splints may also help by stretching the fascia.  Control movement  If taping helps, your healthcare provider may prescribe orthoses. Built from plaster casts of your feet, these inserts control the way your foot moves. As a result, your symptoms should go away.  Reduce overuse  Every time your foot strikes the ground, the plantar fascia is stretched. You can reduce the strain on the plantar fascia and the possibility of overuse by following these suggestions:    Lose any excess weight.    Avoid running on hard or uneven ground.    Use orthoses at all times in your shoes and house slippers.  If surgery is needed  Your healthcare provider may consider surgery if other types of treatment don't control your pain. During surgery, the plantar fascia is partially cut to release tension. As you heal, fibrous tissue fills the space between the heel bone and the plantar fascia.   Date Last Reviewed: 10/14/2015    8182-6068 [employer]. [address] [place] [address]. All rights reserved. This information is not intended as a substitute for professional medical care. Always follow your healthcare professional's instructions.            MADDIE Fisher [facility]

## 2017-12-06 NOTE — PATIENT INSTRUCTIONS
At this time for the plantar fasciitis, continue icing, stretching, tissue massage and support.     Plantar Fasciitis:       * Good walking shoes or running shoes that would be appropriate for your activities.       * Try to minimize the pounding on your feet.  Less high impact and more low.  Minimize quick/accelerating movements (jumping or running fast). Biking is a good form of exercise.     * Stretching twice daily.  30-45 seconds with each stretch.     1-Calve (knee straight)  2-Lower calve (knee bent)  3-Plantar fascia    * Ice if possible after activity.    * Warm-ups in bed before getting up.    * Posterior night splint.    Return to clinic if symptoms not improving after a month and may fit with a Pix[employer] foot pad.     Understanding Plantar Fasciitis    Plantar fasciitis is a condition that causes foot and heel pain. The plantar fascia is a tough band of tissue that runs across the bottom of the foot from the heel to the toes. This tissue pulls on the heel bone. It supports the arch of the foot as it pushes off the ground. If the tissue becomes irritated or red and swollen (inflamed), it is called plantar fasciitis.  How to say it  PLAN-tuhr fa-see-IY-tis   What causes plantar fasciitis?  Plantar fasciitis most often occurs from overusing the plantar fascia. The tissue may become damaged from activities that put repeated stress on the heel and foot. Or it may wear down over time with age and ankle stiffness. You are more likely to have plantar fasciitis if you:    Do activities that require a lot of running, jumping, or dancing    Have a job that requires being on your feet for long periods    Are overweight or obese    Have certain foot problems, such as a tight Achilles tendon, flat feet, or high arches    Often wear poorly fitting shoes  Symptoms of plantar fasciitis  The condition most often causes pain in the heel and the bottom of the foot. The pain may occur when you take your first steps in the  morning. It may get better as you walk throughout the day. But as you continue to put weight on the foot, the pain often returns. Pain may also occur after standing or sitting for long periods.  Treating plantar fasciitis  Treatments for plantar fasciitis include:    Resting the foot. This involves limiting movements that make your foot hurt. You may also need to avoid certain sports and types of work for a time.    Using cold packs. Put an ice pack on the heel and foot to help reduce pain and swelling.    Taking pain medicines. Prescription and over-the-counter pain medicines can help relieve pain and swelling.    Using heel cups or foot inserts (orthotics). These are placed in the shoes to help support the heel or arch and cushion the heel. You may also be told to buy proper-fitting shoes with good arch support and cushioned soles.    Taping the foot. This supports the arch and limits the movement of the plantar fascia to help relieve symptoms.    Wearing a night splint. This stretches the plantar fascia and leg muscles while you sleep. This may help relieve pain.    Doing exercises and physical therapy. These stretch and strengthen the plantar fascia and the muscles in the leg that support the heel and foot.    Getting shots of medicine into the foot. These may help relieve symptoms for a time.    Having surgery. This may be needed if other treatments fail to relieve symptoms. During surgery, the surgeon may partially cut the plantar fascia to release tension.  Possible complications of plantar fasciitis  Without proper care and treatment, healing may take longer than normal. Also, symptoms may continue or get worse. Over time, the plantar fascia may be damaged. This can make it hard to walk or even stand without pain.  When to call your healthcare provider  Call your healthcare provider right away if you have any of these:    Fever of 100.4 F (38 C) or higher, or as directed    Symptoms that don t get better  with treatment, or get worse    New symptoms, such as numbness, tingling, or weakness in the foot   Date Last Reviewed: 3/10/2016    1399-2132 [employer]. [address] [place] [address], [place] [address]. All rights reserved. This information is not intended as a substitute for professional medical care. Always follow your healthcare professional's instructions.        Treating Plantar Fasciitis  First, your healthcare provider tries to determine the cause of your problem in order to suggest ways to relieve pain. If your pain is due to poor foot mechanics, custom-made shoe inserts (orthoses) may help.    Reduce symptoms    To relieve mild symptoms, try aspirin, ibuprofen, or other medicines as directed. Rubbing ice on the affected area may also help.    To reduce severe pain and swelling, your healthcare provider may prescribe pills or injections or a walking cast in some instances. Physical therapy, such as ultrasound or a daily stretching program, may also be recommended. Surgery is rarely required.    To reduce symptoms caused by poor foot mechanics, your foot may be taped. This supports the arch and temporarily controls movement. Night splints may also help by stretching the fascia.  Control movement  If taping helps, your healthcare provider may prescribe orthoses. Built from plaster casts of your feet, these inserts control the way your foot moves. As a result, your symptoms should go away.  Reduce overuse  Every time your foot strikes the ground, the plantar fascia is stretched. You can reduce the strain on the plantar fascia and the possibility of overuse by following these suggestions:    Lose any excess weight.    Avoid running on hard or uneven ground.    Use orthoses at all times in your shoes and house slippers.  If surgery is needed  Your healthcare provider may consider surgery if other types of treatment don't control your pain. During surgery, the plantar fascia is partially cut to release  tension. As you heal, fibrous tissue fills the space between the heel bone and the plantar fascia.   Date Last Reviewed: 10/14/2015    3428-9434 [employer]. [address] [place] [address], [place] [address]. All rights reserved. This information is not intended as a substitute for professional medical care. Always follow your healthcare professional's instructions.

## 2017-12-06 NOTE — NURSING NOTE
Prior to injection verified patient identity using patient's name and date of birth.    Screening Questionnaire for Adult Immunization    Are you sick today?   No   Do you have allergies to medications, food, a vaccine component or latex?   No   Have you ever had a serious reaction after receiving a vaccination?   No   Do you have a long-term health problem with heart disease, lung disease, asthma, kidney disease, metabolic disease (e.g. diabetes), anemia, or other blood disorder?   No   Do you have cancer, leukemia, HIV/AIDS, or any other immune system problem?   No   In the past 3 months, have you taken medications that affect  your immune system, such as prednisone, other steroids, or anticancer drugs; drugs for the treatment of rheumatoid arthritis, Crohn s disease, or psoriasis; or have you had radiation treatments?   No   Have you had a seizure, or a brain or other nervous system problem?   No   During the past year, have you received a transfusion of blood or blood     products, or been given immune (gamma) globulin or antiviral drug?   No   For women: Are you pregnant or is there a chance you could become        pregnant during the next month?   No   Have you received any vaccinations in the past 4 weeks?   No     Immunization questionnaire answers were all negative.        Per orders of Anju Estes, injection of Prevnar 13 given by Iman Mendoza. Patient instructed to remain in clinic for 15 minutes afterwards, and to report any adverse reaction to me immediately.       Screening performed by Iman Mendoza on 12/6/2017 at 12:11 PM.

## 2017-12-06 NOTE — NURSING NOTE
Chief Complaint   Patient presents with     Musculoskeletal Problem     Foot Pain       Initial /84 (BP Location: Right arm, Patient Position: Chair, Cuff Size: Adult Large)  Pulse 76  Temp 97.2  F (36.2  C) (Tympanic)  Resp 20  Wt 194 lb 6.4 oz (88.2 kg) There is no height or weight on file to calculate BMI.  Medication Reconciliation: complete    Health Maintenance that is potentially due pending provider review:  NONE    Iman Mendoza MA  9:40 AM 12/6/2017  .

## 2017-12-28 ENCOUNTER — OFFICE VISIT (OUTPATIENT)
Dept: FAMILY MEDICINE | Facility: CLINIC | Age: 67
End: 2017-12-28
Payer: COMMERCIAL

## 2017-12-28 VITALS
BODY MASS INDEX: 31.39 KG/M2 | TEMPERATURE: 98.8 F | WEIGHT: 200 LBS | HEART RATE: 72 BPM | DIASTOLIC BLOOD PRESSURE: 82 MMHG | SYSTOLIC BLOOD PRESSURE: 130 MMHG | HEIGHT: 67 IN

## 2017-12-28 DIAGNOSIS — Z13.6 SCREENING FOR ABDOMINAL AORTIC ANEURYSM: ICD-10-CM

## 2017-12-28 DIAGNOSIS — R53.83 FATIGUE, UNSPECIFIED TYPE: ICD-10-CM

## 2017-12-28 DIAGNOSIS — Z12.11 SCREENING FOR COLON CANCER: ICD-10-CM

## 2017-12-28 DIAGNOSIS — Z00.00 ENCOUNTER FOR ROUTINE ADULT HEALTH EXAMINATION WITHOUT ABNORMAL FINDINGS: Primary | ICD-10-CM

## 2017-12-28 DIAGNOSIS — E78.5 HYPERLIPIDEMIA LDL GOAL <160: ICD-10-CM

## 2017-12-28 LAB
ALBUMIN SERPL-MCNC: 3.8 G/DL (ref 3.4–5)
ALP SERPL-CCNC: 79 U/L (ref 40–150)
ALT SERPL W P-5'-P-CCNC: 50 U/L (ref 0–70)
ANION GAP SERPL CALCULATED.3IONS-SCNC: 6 MMOL/L (ref 3–14)
AST SERPL W P-5'-P-CCNC: 29 U/L (ref 0–45)
BILIRUB SERPL-MCNC: 0.5 MG/DL (ref 0.2–1.3)
BUN SERPL-MCNC: 20 MG/DL (ref 7–30)
CALCIUM SERPL-MCNC: 8.9 MG/DL (ref 8.5–10.1)
CHLORIDE SERPL-SCNC: 104 MMOL/L (ref 94–109)
CHOLEST SERPL-MCNC: 194 MG/DL
CO2 SERPL-SCNC: 25 MMOL/L (ref 20–32)
CREAT SERPL-MCNC: 0.78 MG/DL (ref 0.66–1.25)
ERYTHROCYTE [DISTWIDTH] IN BLOOD BY AUTOMATED COUNT: 12.8 % (ref 10–15)
GFR SERPL CREATININE-BSD FRML MDRD: >90 ML/MIN/1.7M2
GLUCOSE SERPL-MCNC: 104 MG/DL (ref 70–99)
HCT VFR BLD AUTO: 44.9 % (ref 40–53)
HDLC SERPL-MCNC: 68 MG/DL
HGB BLD-MCNC: 15 G/DL (ref 13.3–17.7)
LDLC SERPL CALC-MCNC: 99 MG/DL
MCH RBC QN AUTO: 31.3 PG (ref 26.5–33)
MCHC RBC AUTO-ENTMCNC: 33.4 G/DL (ref 31.5–36.5)
MCV RBC AUTO: 94 FL (ref 78–100)
NONHDLC SERPL-MCNC: 126 MG/DL
PLATELET # BLD AUTO: 183 10E9/L (ref 150–450)
POTASSIUM SERPL-SCNC: 4.3 MMOL/L (ref 3.4–5.3)
PROT SERPL-MCNC: 7.6 G/DL (ref 6.8–8.8)
RBC # BLD AUTO: 4.79 10E12/L (ref 4.4–5.9)
SODIUM SERPL-SCNC: 135 MMOL/L (ref 133–144)
TRIGL SERPL-MCNC: 133 MG/DL
TSH SERPL DL<=0.005 MIU/L-ACNC: 2.32 MU/L (ref 0.4–4)
WBC # BLD AUTO: 6.4 10E9/L (ref 4–11)

## 2017-12-28 PROCEDURE — 84443 ASSAY THYROID STIM HORMONE: CPT | Performed by: NURSE PRACTITIONER

## 2017-12-28 PROCEDURE — 36415 COLL VENOUS BLD VENIPUNCTURE: CPT | Performed by: NURSE PRACTITIONER

## 2017-12-28 PROCEDURE — G0438 PPPS, INITIAL VISIT: HCPCS | Performed by: NURSE PRACTITIONER

## 2017-12-28 PROCEDURE — 80061 LIPID PANEL: CPT | Performed by: NURSE PRACTITIONER

## 2017-12-28 PROCEDURE — 80053 COMPREHEN METABOLIC PANEL: CPT | Performed by: NURSE PRACTITIONER

## 2017-12-28 PROCEDURE — 85027 COMPLETE CBC AUTOMATED: CPT | Performed by: NURSE PRACTITIONER

## 2017-12-28 PROCEDURE — 82306 VITAMIN D 25 HYDROXY: CPT | Performed by: NURSE PRACTITIONER

## 2017-12-28 NOTE — LETTER
[facility]  [address] [place] [id number]  Phone: [phone]  Fax: [phone]    12/29/17    Magdaleno Dutton  [address] [facility] [address]        Magdaleno,    Desired or goal levels are:   CHOLESTEROL: Desirable is less than 200.   HDL (Good Cholesterol): Desirable is greater than 40 (for men) greater than 50 (for women).   LDL (Bad Cholesterol): Desirable is less than 130 (or less than 100 if you have heart disease or diabetes). Borderline 130-160.   TRIGLYCERIDES: Desirable is less than 150.  Borderline is 150-200.     Keep up the good work!.     As you may know, an elevated cholesterol is one factor that increases your risk for heart disease and stroke. You can improve your cholesterol by controlling the amount and type of fat you eat and by increasing your daily activity level.     Here are some ways to improve your nutrition:   Eat less fat (especially butter, Crisco and other saturated fats)   Buy lean cuts of meat, reduce your portions of red meat or substitute poultry or fish   Use skim milk and low-fat dairy products   Eat no more than 4 egg yolks per week   Avoid fried or fast foods that are high in fat   Eat more fruits and vegetables     Also consider starting or increasing your aerobic activity. Aerobic activity is the best way to improve HDL (good) cholesterol. If this would be new to you, please talk with me first about what activities are safe for you.   Other lab results Glucose (blood sugar) , Liver tests, Kidney test, metabolic profile, CBC (Complete Blood Count), Thyroid Function (TSH) was / were normal, your Vitamin D level was low normal. I understand you are taking a Vitamin D supplement, I would ensure that this is 800 IU a day.   Please feel free to contact us with any questions or if you would like more information    Sincerely,      MADDIE Fisher CNP

## 2017-12-28 NOTE — MR AVS SNAPSHOT
After Visit Summary   12/28/2017    Magdaleno Dutton    MRN: [id number]           Patient Information     Date Of Birth          1950        Visit Information        Provider Department      12/28/2017 8:40 AM Anju Antonio APRN [facility]        Today's Diagnoses     Encounter for routine adult health examination without abnormal findings    -  1    Screening for colon cancer        Hyperlipidemia LDL goal <160        Screening for abdominal aortic aneurysm        Fatigue, unspecified type          Care Instructions    Lab work today - will update you on the results    No change in any medications     Schedule an ultrasound of your abdomen to evaluate for aneurysm - can schedule in [place]     Schedule an eye exam at your convenience    Get your FIT test in to the lab - can bring to [place]     See me back in 1 year for routine physical       Preventive Health Recommendations:   Male Ages 65 and over    Yearly exam:             See your health care provider every year in order to  o   Review health changes.   o   Discuss preventive care.    o   Review your medicines if your doctor has prescribed any.    Talk with your health care provider about whether you should have a test to screen for prostate cancer (PSA).    Every 3 years, have a diabetes test (fasting glucose). If you are at risk for diabetes, you should have this test more often.    Every 5 years, have a cholesterol test. Have this test more often if you are at risk for high cholesterol or heart disease.     Every 10 years, have a colonoscopy. Or, have a yearly FIT test (stool test). These exams will check for colon cancer.    Talk to with your health care provider about screening for Abdominal Aortic Aneurysm if you have a family history of AAA or have a history of smoking.    Shots:     Get a flu shot each year.     Get a tetanus shot every 10 years.     Talk to your doctor about your pneumonia  vaccines. There are now two you should receive - Pneumovax (PPSV 23) and Prevnar (PCV 13).     Talk to your doctor about a shingles vaccine.     Talk to your doctor about the hepatitis B vaccine.  Nutrition:     Eat at least 5 servings of fruits and vegetables each day.     Eat whole-grain bread, whole-wheat pasta and brown rice instead of white grains and rice.     Talk to your provider about Calcium and Vitamin D.   Lifestyle    Exercise for at least 150 minutes a week (30 minutes a day, 5 days a week). This will help you control your weight and prevent disease.     Limit alcohol to one drink per day.     No smoking.     Wear sunscreen to prevent skin cancer.     See your dentist every six months for an exam and cleaning.     See your eye doctor every 1 to 2 years to screen for conditions such as glaucoma, macular degeneration, cataracts, etc           Follow-ups after your visit        Future tests that were ordered for you today     Open Future Orders        Priority Expected Expires Ordered    Fecal colorectal cancer screen (FIT) Routine 1/18/2018 3/22/2018 12/28/2017    US abdominal aorta limited Routine  12/28/2018 12/28/2017            Who to contact     If you have questions or need follow up information about today's clinic visit or your schedule please contact [facility] directly at [phone].  Normal or non-critical lab and imaging results will be communicated to you by [employer], letter or phone within 4 business days after the clinic has received the results. If you do not hear from us within 7 days, please contact the clinic through [employer] or phone. If you have a critical or abnormal lab result, we will notify you by phone as soon as possible.  Submit refill requests through [employer] or call your pharmacy and they will forward the refill request to us. Please allow 3 business days for your refill to be completed.          Additional Information About Your Visit        MyChart  "Information     [employer] lets you send messages to your doctor, view your test results, renew your prescriptions, schedule appointments and more. To sign up, go to [email or web address] . Click on \"Log in\" on the left side of the screen, which will take you to the Welcome page. Then click on \"Sign up Now\" on the right side of the page.     You will be asked to enter the access code listed below, as well as some personal information. Please follow the directions to create your username and password.     Your access code is: [email or web address]  Expires: 2017 11:09 AM     Your access code will  in 90 days. If you need help or a new code, please call your [place] clinic or [phone].        Care EveryWhere ID     This is your Care EveryWhere ID. This could be used by other organizations to access your [place] medical records  [id number]        Your Vitals Were     Pulse Temperature Height BMI (Body Mass Index)          72 98.8  F (37.1  C) (Tympanic) 5' 7\" (1.702 m) 31.32 kg/m2         Blood Pressure from Last 3 Encounters:   17 130/82   17 132/84   10/16/17 (!) 161/107    Weight from Last 3 Encounters:   17 200 lb (90.7 kg)   17 194 lb 6.4 oz (88.2 kg)   10/16/17 195 lb (88.5 kg)              We Performed the Following     CBC with platelets     Comprehensive metabolic panel (BMP + Alb, Alk Phos, ALT, AST, Total. Bili, TP)     Lipid panel reflex to direct LDL Fasting     TSH with free T4 reflex     Vitamin D Deficiency        Primary Care Provider Office Phone # Fax #    MADDIE Lay [phone]       [facility] [address] [place] [id number]        Equal Access to Services     JEFFREY MCDOWELL : Armin Simons, mary khanna, eddie correa. So [facility] [phone].    ATENCIÓN: Si habla español, tiene a schneider disposición servicios gratuitos de asistencia lingüística. " Colin kothari [phone].    We comply with applicable federal civil rights laws and [place] laws. We do not discriminate on the basis of race, color, national origin, age, disability, sex, sexual orientation, or gender identity.            Thank you!     Thank you for choosing [facility]  for your care. Our goal is always to provide you with excellent care. Hearing back from our patients is one way we can continue to improve our services. Please take a few minutes to complete the written survey that you may receive in the mail after your visit with us. Thank you!             Your Updated Medication List - Protect others around you: Learn how to safely use, store and throw away your medicines at www.disposemymeds.org.          This list is accurate as of: 12/28/17  9:15 AM.  Always use your most recent med list.                   Brand Name Dispense Instructions for use Diagnosis    ASPIRIN PO      Take 81 mg by mouth        VITAMIN C PO           VITAMIN D PO

## 2017-12-28 NOTE — PATIENT INSTRUCTIONS
Lab work today - will update you on the results    No change in any medications     Schedule an ultrasound of your abdomen to evaluate for aneurysm - can schedule in [place]     Schedule an eye exam at your convenience    Get your FIT test in to the lab - can bring to [place]     See me back in 1 year for routine physical       Preventive Health Recommendations:   Male Ages 65 and over    Yearly exam:             See your health care provider every year in order to  o   Review health changes.   o   Discuss preventive care.    o   Review your medicines if your doctor has prescribed any.    Talk with your health care provider about whether you should have a test to screen for prostate cancer (PSA).    Every 3 years, have a diabetes test (fasting glucose). If you are at risk for diabetes, you should have this test more often.    Every 5 years, have a cholesterol test. Have this test more often if you are at risk for high cholesterol or heart disease.     Every 10 years, have a colonoscopy. Or, have a yearly FIT test (stool test). These exams will check for colon cancer.    Talk to with your health care provider about screening for Abdominal Aortic Aneurysm if you have a family history of AAA or have a history of smoking.    Shots:     Get a flu shot each year.     Get a tetanus shot every 10 years.     Talk to your doctor about your pneumonia vaccines. There are now two you should receive - Pneumovax (PPSV 23) and Prevnar (PCV 13).     Talk to your doctor about a shingles vaccine.     Talk to your doctor about the hepatitis B vaccine.  Nutrition:     Eat at least 5 servings of fruits and vegetables each day.     Eat whole-grain bread, whole-wheat pasta and brown rice instead of white grains and rice.     Talk to your provider about Calcium and Vitamin D.   Lifestyle    Exercise for at least 150 minutes a week (30 minutes a day, 5 days a week). This will help you control your weight and prevent disease.     Limit  alcohol to one drink per day.     No smoking.     Wear sunscreen to prevent skin cancer.     See your dentist every six months for an exam and cleaning.     See your eye doctor every 1 to 2 years to screen for conditions such as glaucoma, macular degeneration, cataracts, etc

## 2017-12-28 NOTE — PROGRESS NOTES
"SUBJECTIVE:   Magdaleno Dutton is a 67 year old male who presents for Preventive Visit.  {PVP to remind patient that this is not necessarily a physical exam; physical exam may or may not be done:930114::\"click delete button to remove this line now\"}  {PVP to inform patient that additional E&M charge may apply, if additional problems addressed:818346::\"click delete button to remove this line now\"}  Are you in the first 12 months of your Medicare coverage?  {No Yes:513072::\"No\"}    HPI  {Hearing Test Done (Optional):822210}  {Add if <65 person on Medicare  - Required Questions (Optional):674262}  Fall risk:  {Document Fall Risk in the Assessments Section of the Navigator:587213}  {If any of the above assessments are answered yes, consider ordering appropriate referrals (Optional):444301::\"click delete button to remove this line now\"}  {AWV Cognitive Screenin}    Reviewed and updated as needed this visit by clinical staff         Reviewed and updated as needed this visit by Provider        Social History   Substance Use Topics     Smoking status: Former Smoker     Quit date: 2001     Smokeless tobacco: Never Used     Alcohol use Yes      Comment: 4-5 beers at night        No flowsheet data found.{add AUDIT responses (Optional) (A score of 7 for adult men is an indication of hazardous drinking; a score of 8 or more is an indication of an alcohol use disorder.  A score of 7 or more for adult women is an indication of hazardous drinking or an alchohol use disorder):410581}    {Outside tests to abstract? :372034}    {additional problems to add (Optional):462372}    Today's PHQ-2 Score:   PHQ-2 (  [employer]) 2016   Q1: Little interest or pleasure in doing things 0   Q2: Feeling down, depressed or hopeless 0   PHQ-2 Score 0       Do you feel safe in your environment - {YES/NO/NA:459303}    Do you have a Health Care Directive?: {HEALTHCARE DIRECTIVE STATUS:224920}    Current providers sharing in care for " this patient include:   Patient Care Team:  Anju Antonio APRN CNP as PCP - General (Nurse Practitioner - Family)    The following health maintenance items are reviewed in Epic and correct as of today:  Health Maintenance   Topic Date Due     COLON CANCER SCREEN (SYSTEM ASSIGNED)  2001     AORTIC ANEURYSM SCREENING (SYSTEM ASSIGNED)  2016     FALL RISK ASSESSMENT  2017     PNEUMOCOCCAL (2 of 2 - PPSV23) 2018     ADVANCE DIRECTIVE PLANNING Q5 YRS  2021     LIPID SCREEN Q5 YR MALE (SYSTEM ASSIGNED)  2021     TETANUS IMMUNIZATION (SYSTEM ASSIGNED)  2026     INFLUENZA VACCINE (SYSTEM ASSIGNED)  Completed     HEPATITIS C SCREENING  Completed     {Chronicprobdata (Optional):980006}    {Decision Support (Optional):358701}    Review of Systems  {ROS COMP:167956}    OBJECTIVE:   There were no vitals taken for this visit. There is no height or weight on file to calculate BMI.  Physical Exam  {Exam :379456}    ASSESSMENT / PLAN:   {Diag Picklist:291598}    End of Life Planning:  Patient currently has an advanced directive: { :230906}    COUNSELING:  {Medicare Counselin}    {BP Counseling- Complete if BP >= 120/80  (Optional):316043}    There is no height or weight on file to calculate BMI.  {Weight Management Plan -- Complete if patient has an abnormal BMI (Optional):138869}   reports that he quit smoking about 17 years ago. He has never used smokeless tobacco.  {Tobacco Cessation -- Complete if patient is a smoker (Optional):475348}    Appropriate preventive services were discussed with this patient, including applicable screening as appropriate for cardiovascular disease, diabetes, osteopenia/osteoporosis, and glaucoma.  As appropriate for age/gender, discussed screening for colorectal cancer, prostate cancer, breast cancer, and cervical cancer. Checklist reviewing preventive services available has been given to the patient.    Reviewed patients plan of care and  provided an AVS. The {CarePlan:125078} for Magdaleno meets the Care Plan requirement. This Care Plan has been established and reviewed with the {PATIENT, FAMILY MEMBER, CAREGIVER:695465}.    Counseling Resources:  ATP IV Guidelines  Pooled Cohorts Equation Calculator  Breast Cancer Risk Calculator  FRAX Risk Assessment  ICSI Preventive Guidelines  Dietary Guidelines for Americans, 2010  USDA's MyPlate  ASA Prophylaxis  Lung CA Screening    MADDIE Fisher CNP  [facility]

## 2017-12-28 NOTE — NURSING NOTE
"Chief Complaint   Patient presents with     Physical       Initial /82 (Cuff Size: Adult Large)  Pulse 72  Temp 98.8  F (37.1  C) (Tympanic)  Ht 5' 7\" (1.702 m)  Wt 200 lb (90.7 kg)  BMI 31.32 kg/m2 Estimated body mass index is 31.32 kg/(m^2) as calculated from the following:    Height as of this encounter: 5' 7\" (1.702 m).    Weight as of this encounter: 200 lb (90.7 kg).  Medication Reconciliation: complete    Health Maintenance that is potentially due pending provider review:  Colonoscopy/FIT - pt declines       Is there anyone who you would like to be able to receive your results? Yes  If yes have patient fill out LASHAUN    Carrie Khan CMA    "

## 2017-12-28 NOTE — PROGRESS NOTES
SUBJECTIVE:   Magdaleno Dutton is a 67 year old male who presents for Preventive Visit.    Are you in the first 12 months of your Medicare Part B coverage?  No    Healthy Habits:    Do you get at least three servings of calcium containing foods daily (dairy, green leafy vegetables, etc.)? no, taking calcium and/or vitamin D supplement: yes     Amount of exercise or daily activities, outside of work: Farms     Problems taking medications regularly not applicable    Medication side effects: No    Have you had an eye exam in the past two years? yes    Do you see a dentist twice per year? Once per year     Do you have sleep apnea, excessive snoring or daytime drowsiness?no      Ability to successfully perform activities of daily living: Yes, no assistance needed    Home safety:  none identified     Hearing impairment: Yes, has hearing aids     Fall risk:       COGNITIVE SCREEN  1) Repeat 3 items (Banana, Sunrise, Chair)    2) Clock draw: NORMAL  3) 3 item recall: Recalls 3 objects  Results: 3 items recalled: COGNITIVE IMPAIRMENT LESS LIKELY    Mini-CogTM Copyright S Del[employer]. Licensed by the author for use in [facility]; reprinted with permission ([email or web address]). All rights reserved.      Fatigue   Has had more fatigue over the last year or so  Feels run down in the afternoons, is up early to do chores   Needing a nap in the afternoon   Feels like he sleeps well at night     Reviewed and updated as needed this visit by clinical staff  Tobacco  Allergies  Meds  Problems  Med Hx  Surg Hx  Fam Hx  Soc Hx          Reviewed and updated as needed this visit by Provider  Allergies  Meds  Problems        Social History   Substance Use Topics     Smoking status: Former Smoker     Quit date: 1/1/2001     Smokeless tobacco: Never Used     Alcohol use Yes      Comment: 4-5 beers at night        If you drink alcohol do you typically have >3 drinks per day or >7 drinks per week? Yes - AUDIT SCORE:     No  flowsheet data found.                            Today's PHQ-2 Score:   PHQ-2 ( 1999 Pfizer) 12/19/2016 9/23/2016   Q1: Little interest or pleasure in doing things 0 0   Q2: Feeling down, depressed or hopeless 0 0   PHQ-2 Score 0 0     Do you feel safe in your environment - Yes    Do you have a Health Care Directive?: No: Advance care planning was reviewed with patient; patient declined at this time.      Current providers sharing in care for this patient include: Patient Care Team:  Anju Antonio APRN CNP as PCP - General (Nurse Practitioner - Family)    The following health maintenance items are reviewed in Epic and correct as of today:  Health Maintenance   Topic Date Due     COLON CANCER SCREEN (SYSTEM ASSIGNED)  01/29/2001     AORTIC ANEURYSM SCREENING (SYSTEM ASSIGNED)  01/29/2016     FALL RISK ASSESSMENT  12/19/2017     PNEUMOCOCCAL (2 of 2 - PPSV23) 12/06/2018     ADVANCE DIRECTIVE PLANNING Q5 YRS  12/19/2021     LIPID SCREEN Q5 YR MALE (SYSTEM ASSIGNED)  12/28/2022     TETANUS IMMUNIZATION (SYSTEM ASSIGNED)  12/19/2026     INFLUENZA VACCINE (SYSTEM ASSIGNED)  Completed     HEPATITIS C SCREENING  Completed     Labs reviewed in EPIC  BP Readings from Last 3 Encounters:   12/28/17 130/82   12/06/17 132/84   10/16/17 (!) 161/107    Wt Readings from Last 3 Encounters:   12/28/17 200 lb (90.7 kg)   12/06/17 194 lb 6.4 oz (88.2 kg)   10/16/17 195 lb (88.5 kg)                  Patient Active Problem List   Diagnosis     Hyperlipidemia LDL goal <160     Advanced directives, counseling/discussion     History reviewed. No pertinent surgical history.    Social History   Substance Use Topics     Smoking status: Former Smoker     Quit date: 1/1/2001     Smokeless tobacco: Never Used     Alcohol use Yes      Comment: 4-5 beers at night      Family History   Problem Relation Age of Onset     CANCER Father      DIABETES Sister      DIABETES Sister          Current Outpatient Prescriptions   Medication Sig Dispense  "Refill     ASPIRIN PO Take 81 mg by mouth       Ascorbic Acid (VITAMIN C PO)        Cholecalciferol (VITAMIN D PO)        No Known Allergies        Pneumonia Vaccine:Adults age 65+ who have not received previous Pneumovax (PPSV23) or PCV13 as an adult: Should first be given PCV13 AND then should be given PPSV23 6-12 months after PCV13    ROS:  C: NEGATIVE for fever, chills, change in weight  I: NEGATIVE for worrisome rashes, moles or lesions  E: NEGATIVE for vision changes or irritation  E/M: NEGATIVE for ear, mouth and throat problems  R: NEGATIVE for significant cough or SOB  B: NEGATIVE for masses, tenderness or discharge  CV: NEGATIVE for chest pain, palpitations or peripheral edema  GI: NEGATIVE for nausea, abdominal pain, heartburn, or change in bowel habits  : NEGATIVE for frequency, dysuria, or hematuria  M: NEGATIVE for significant arthralgias or myalgia  N: NEGATIVE for weakness, dizziness or paresthesias  E: NEGATIVE for temperature intolerance, skin/hair changes  H: NEGATIVE for bleeding problems  P: NEGATIVE for changes in mood or affect    OBJECTIVE:   /82 (Cuff Size: Adult Large)  Pulse 72  Temp 98.8  F (37.1  C) (Tympanic)  Ht 5' 7\" (1.702 m)  Wt 200 lb (90.7 kg)  BMI 31.32 kg/m2 Estimated body mass index is 31.32 kg/(m^2) as calculated from the following:    Height as of this encounter: 5' 7\" (1.702 m).    Weight as of this encounter: 200 lb (90.7 kg).  EXAM:   GENERAL: healthy, alert and no distress  EYES: Eyes grossly normal to inspection, PERRL and conjunctivae and sclerae normal  HENT: ear canals and TM's normal, nose and mouth without ulcers or lesions  NECK: no adenopathy, no asymmetry, masses, or scars and thyroid normal to palpation  RESP: lungs clear to auscultation - no rales, rhonchi or wheezes  CV: regular rate and rhythm, normal S1 S2, no S3 or S4, no murmur, click or rub, no peripheral edema and peripheral pulses strong  ABDOMEN: soft, nontender, no hepatosplenomegaly, no " "masses and bowel sounds normal  MS: no gross musculoskeletal defects noted, no edema  SKIN: no suspicious lesions or rashes  NEURO: Normal strength and tone, mentation intact and speech normal  PSYCH: mentation appears normal, affect normal/bright    ASSESSMENT / PLAN:   1. Encounter for routine adult health examination without abnormal findings    - Comprehensive metabolic panel (BMP + Alb, Alk Phos, ALT, AST, Total. Bili, TP)  - CBC with platelets    2. Fatigue, unspecified type    - Vitamin D Deficiency  - TSH with free T4 reflex    3. Hyperlipidemia LDL goal <160    - Lipid panel reflex to direct LDL Fasting    4. Screening for colon cancer  Patient declines colonoscopy despite discussion of benefit. Will do FIT test  - Fecal colorectal cancer screen (FIT); Future    5. Screening for abdominal aortic aneurysm    - US abdominal aorta limited; Future    End of Life Planning:  Patient currently has an advanced directive: No.  I have verified the patient's ablity to prepare an advanced directive/make health care decisions.  Literature was provided to assist patient in preparing an advanced directive.    COUNSELING:  Reviewed preventive health counseling, as reflected in patient instructions       Consider AAA screening for ages 65-75 and smoking history       Regular exercise       Healthy diet/nutrition       Alcohol Use      BP Screening:   Last 3 BP Readings:    BP Readings from Last 3 Encounters:   12/28/17 130/82   12/06/17 132/84   10/16/17 (!) 161/107       The following was recommended to the patient:  Re-screen BP within a year and recommended lifestyle modifications  Estimated body mass index is 31.32 kg/(m^2) as calculated from the following:    Height as of this encounter: 5' 7\" (1.702 m).    Weight as of this encounter: 200 lb (90.7 kg).  Weight management plan: Discussed healthy diet and exercise guidelines and patient will follow up in 12 months in clinic to re-evaluate.     reports that he quit " smoking about 17 years ago. He has never used smokeless tobacco.        Appropriate preventive services were discussed with this patient, including applicable screening as appropriate for cardiovascular disease, diabetes, osteopenia/osteoporosis, and glaucoma.  As appropriate for age/gender, discussed screening for colorectal cancer, prostate cancer, breast cancer, and cervical cancer. Checklist reviewing preventive services available has been given to the patient.    Reviewed patients plan of care and provided an AVS. The Basic Care Plan (routine screening as documented in Health Maintenance) for Magdaleno meets the Care Plan requirement. This Care Plan has been established and reviewed with the Patient.    Counseling Resources:  ATP IV Guidelines  Pooled Cohorts Equation Calculator  Breast Cancer Risk Calculator  FRAX Risk Assessment  ICSI Preventive Guidelines  Dietary Guidelines for Americans, 2010  USDA's MyPlate  ASA Prophylaxis  Lung CA Screening    MADDIE Fisher [facility]

## 2017-12-29 LAB — DEPRECATED CALCIDIOL+CALCIFEROL SERPL-MC: 29 UG/L (ref 20–75)

## 2018-10-15 ENCOUNTER — ALLIED HEALTH/NURSE VISIT (OUTPATIENT)
Dept: FAMILY MEDICINE | Facility: CLINIC | Age: 68
End: 2018-10-15
Payer: COMMERCIAL

## 2018-10-15 VITALS — SYSTOLIC BLOOD PRESSURE: 150 MMHG | DIASTOLIC BLOOD PRESSURE: 100 MMHG

## 2018-10-15 DIAGNOSIS — Z23 NEED FOR PROPHYLACTIC VACCINATION AND INOCULATION AGAINST INFLUENZA: Primary | ICD-10-CM

## 2018-10-15 PROCEDURE — 90662 IIV NO PRSV INCREASED AG IM: CPT

## 2018-10-15 PROCEDURE — G0008 ADMIN INFLUENZA VIRUS VAC: HCPCS

## 2018-10-15 NOTE — MR AVS SNAPSHOT
After Visit Summary   10/15/2018    Magdaleno Dutton    MRN: [id number]           Patient Information     Date Of Birth          1950        Visit Information        Provider Department      10/15/2018 9:15 AM MATTY BRYANT MA [facility]        Today's Diagnoses     Need for prophylactic vaccination and inoculation against influenza    -  1       Follow-ups after your visit        Your next 10 appointments already scheduled     Oct 22, 2018  9:30 AM CDT   US ABDOMINAL AORTIC IMAGING with PHUS1   [facility] Ultrasound ([facility])    [address] [place] [id number] [phone]           How do I prepare for my exam? (Food and drink instructions) Adults: No eating, smoking, gum chewing or drinking for 8 hours before the exam. You may take medicine with a small sip of water.  Children: * Infants, breast-fed: may have breast milk up to 2 hours before exam. * Infants, formula: may have bottle until 4 hours before exam. * Children 1-5 years: No food or drink for 4 hours before exam. * Children 6 -12 years: No food or drink for 6 hours before exam. * Children over 12 years: No food or drink for 8 hours before exam.  * J Tube Fed: No need to stop feedings.  What should I wear: Wear comfortable clothes.  How long does the exam take: Most ultrasounds take 30 to 60 minutes.  What should I bring: Bring a list of your medicines, including vitamins, minerals and over-the-counter drugs. It is safest to leave personal items at home.  Do I need a :  No  is needed.  What do I need to tell my doctor: Tell your doctor about any allergies you may have.  What should I do after the exam: No restrictions, You may resume normal activities.  What is this test: An ultrasound uses sound waves to make pictures of the body. Sound waves do not cause pain. The only discomfort may be the pressure of the wand against your skin or full bladder.  Who should I call  with questions: If you have any questions, please call the Imaging Department where you will have your exam. Directions, parking instructions, and other information is available on our website, [email or web address].              Who to contact     If you have questions or need follow up information about today's clinic visit or your schedule please contact [facility] directly at [phone].  Normal or non-critical lab and imaging results will be communicated to you by MyChart, letter or phone within 4 business days after the clinic has received the results. If you do not hear from us within 7 days, please contact the clinic through MyChart or phone. If you have a critical or abnormal lab result, we will notify you by phone as soon as possible.  Submit refill requests through [employer] or call your pharmacy and they will forward the refill request to us. Please allow 3 business days for your refill to be completed.          Additional Information About Your Visit        Care EveryWhere ID     This is your Care EveryWhere ID. This could be used by other organizations to access your [facility] medical records  [id number]         Blood Pressure from Last 3 Encounters:   10/15/18 (!) 150/100   12/28/17 130/82   12/06/17 132/84    Weight from Last 3 Encounters:   12/28/17 200 lb (90.7 kg)   12/06/17 194 lb 6.4 oz (88.2 kg)   10/16/17 195 lb (88.5 kg)              We Performed the Following     FLU VACCINE, INCREASED ANTIGEN, PRESV FREE, AGE 65+ [68211]     Vaccine Administration, Initial [53623]        Primary Care Provider Fax #    Physician No Ref-Primary [phone]       No address on file        Equal Access to Services     [facility]: Hadii carolina brown Soroger, waaxda luqadaha, qaybta kaalmaeddie moore. So [facility] [phone].    ATENCIÓN: Si habla español, tiene a schneider disposición servicios gratuitos de asistencia lingüística. Llame al  [phone].    We comply with applicable federal civil rights laws and [place] laws. We do not discriminate on the basis of race, color, national origin, age, disability, sex, sexual orientation, or gender identity.            Thank you!     Thank you for choosing [facility]  for your care. Our goal is always to provide you with excellent care. Hearing back from our patients is one way we can continue to improve our services. Please take a few minutes to complete the written survey that you may receive in the mail after your visit with us. Thank you!             Your Updated Medication List - Protect others around you: Learn how to safely use, store and throw away your medicines at www.disposemymeds.org.          This list is accurate as of 10/15/18  9:30 AM.  Always use your most recent med list.                   Brand Name Dispense Instructions for use Diagnosis    ASPIRIN PO      Take 81 mg by mouth        VITAMIN C PO           VITAMIN D PO

## 2018-10-15 NOTE — PROGRESS NOTES
Injectable Influenza Immunization Documentation    1.  Is the person to be vaccinated sick today?   No    2. Does the person to be vaccinated have an allergy to a component   of the vaccine?   No  Egg Allergy Algorithm Link    3. Has the person to be vaccinated ever had a serious reaction   to influenza vaccine in the past?   No    4. Has the person to be vaccinated ever had Guillain-Barré syndrome?   No    Form completed by Nicole Ferreira CMA        Chief Complaint   Patient presents with     Allied Health Visit     Flu Shot      Patient also asked to have his BP checked. The reading was 150/100. He states no symptoms and feels fine. I advised patient to get in to see a provider soon to discuss his elevated BP. He states understanding and will call to set up an appointment with a new PCP.  Nicole Ferreira CMA

## 2018-10-22 ENCOUNTER — HOSPITAL ENCOUNTER (OUTPATIENT)
Dept: ULTRASOUND IMAGING | Facility: CLINIC | Age: 68
Discharge: HOME | End: 2018-10-22
Attending: NURSE PRACTITIONER | Admitting: NURSE PRACTITIONER
Payer: COMMERCIAL

## 2018-10-22 DIAGNOSIS — Z13.6 SCREENING FOR ABDOMINAL AORTIC ANEURYSM: ICD-10-CM

## 2018-10-22 PROCEDURE — 76775 US EXAM ABDO BACK WALL LIM: CPT

## 2018-10-24 ENCOUNTER — OFFICE VISIT (OUTPATIENT)
Dept: FAMILY MEDICINE | Facility: CLINIC | Age: 68
End: 2018-10-24
Payer: COMMERCIAL

## 2018-10-24 VITALS
TEMPERATURE: 98.6 F | OXYGEN SATURATION: 96 % | DIASTOLIC BLOOD PRESSURE: 84 MMHG | BODY MASS INDEX: 31.57 KG/M2 | SYSTOLIC BLOOD PRESSURE: 160 MMHG | HEART RATE: 74 BPM | RESPIRATION RATE: 24 BRPM | WEIGHT: 196.4 LBS | HEIGHT: 66 IN

## 2018-10-24 DIAGNOSIS — Z12.11 SPECIAL SCREENING FOR MALIGNANT NEOPLASMS, COLON: ICD-10-CM

## 2018-10-24 DIAGNOSIS — I10 HYPERTENSION GOAL BP (BLOOD PRESSURE) < 140/90: Primary | ICD-10-CM

## 2018-10-24 LAB
ALBUMIN SERPL-MCNC: 4 G/DL (ref 3.4–5)
ALP SERPL-CCNC: 76 U/L (ref 40–150)
ALT SERPL W P-5'-P-CCNC: 52 U/L (ref 0–70)
ANION GAP SERPL CALCULATED.3IONS-SCNC: 6 MMOL/L (ref 3–14)
AST SERPL W P-5'-P-CCNC: 34 U/L (ref 0–45)
BILIRUB SERPL-MCNC: 0.4 MG/DL (ref 0.2–1.3)
BUN SERPL-MCNC: 16 MG/DL (ref 7–30)
CALCIUM SERPL-MCNC: 9.2 MG/DL (ref 8.5–10.1)
CHLORIDE SERPL-SCNC: 107 MMOL/L (ref 94–109)
CO2 SERPL-SCNC: 24 MMOL/L (ref 20–32)
CREAT SERPL-MCNC: 0.67 MG/DL (ref 0.66–1.25)
ERYTHROCYTE [DISTWIDTH] IN BLOOD BY AUTOMATED COUNT: 13 % (ref 10–15)
GFR SERPL CREATININE-BSD FRML MDRD: >90 ML/MIN/1.7M2
GLUCOSE SERPL-MCNC: 93 MG/DL (ref 70–99)
HCT VFR BLD AUTO: 46.6 % (ref 40–53)
HGB BLD-MCNC: 15.8 G/DL (ref 13.3–17.7)
MCH RBC QN AUTO: 31.5 PG (ref 26.5–33)
MCHC RBC AUTO-ENTMCNC: 33.9 G/DL (ref 31.5–36.5)
MCV RBC AUTO: 93 FL (ref 78–100)
PLATELET # BLD AUTO: 204 10E9/L (ref 150–450)
POTASSIUM SERPL-SCNC: 4 MMOL/L (ref 3.4–5.3)
PROT SERPL-MCNC: 7.6 G/DL (ref 6.8–8.8)
RBC # BLD AUTO: 5.02 10E12/L (ref 4.4–5.9)
SODIUM SERPL-SCNC: 137 MMOL/L (ref 133–144)
TSH SERPL DL<=0.005 MIU/L-ACNC: 1.37 MU/L (ref 0.4–4)
WBC # BLD AUTO: 6.4 10E9/L (ref 4–11)

## 2018-10-24 PROCEDURE — 85027 COMPLETE CBC AUTOMATED: CPT | Performed by: NURSE PRACTITIONER

## 2018-10-24 PROCEDURE — 80053 COMPREHEN METABOLIC PANEL: CPT | Performed by: NURSE PRACTITIONER

## 2018-10-24 PROCEDURE — 84443 ASSAY THYROID STIM HORMONE: CPT | Performed by: NURSE PRACTITIONER

## 2018-10-24 PROCEDURE — 99214 OFFICE O/P EST MOD 30 MIN: CPT | Performed by: NURSE PRACTITIONER

## 2018-10-24 PROCEDURE — 36415 COLL VENOUS BLD VENIPUNCTURE: CPT | Performed by: NURSE PRACTITIONER

## 2018-10-24 RX ORDER — LISINOPRIL 10 MG/1
10 TABLET ORAL DAILY
Qty: 30 TABLET | Refills: 1 | Status: SHIPPED | OUTPATIENT
Start: 2018-10-24 | End: 2018-11-12

## 2018-10-24 ASSESSMENT — PAIN SCALES - GENERAL: PAINLEVEL: NO PAIN (0)

## 2018-10-24 NOTE — MR AVS SNAPSHOT
After Visit Summary   10/24/2018    Magdaleno Dutton    MRN: [id number]           Patient Information     Date Of Birth          1950        Visit Information        Provider Department      10/24/2018 9:40 AM Anju Antonio APRN [facility]        Today's Diagnoses     Special screening for malignant neoplasms, colon    -  1    Hypertension goal BP (blood pressure) < 140/90          Care Instructions    Start Lisinopril for your blood pressure     Will get lab work today as well - will call you with results     Schedule a nurse only visit in 2 weeks for a blood pressure check  - we will determine any changes from there - [phone] - ASK for family practice nurse visit       See me back in 6 months     The  will call you to schedule your colonoscopy in [place].           Follow-ups after your visit        Additional Services     GASTROENTEROLOGY ADULT REF PROCEDURE ONLY       Last Lab Result: Creatinine (mg/dL)       Date                     Value                 12/28/2017               0.78             ----------  There is no height or weight on file to calculate BMI.      Patient will be contacted to schedule procedure.     Please be aware that coverage of these services is subject to the terms and limitations of your health insurance plan.  Call member services at your health plan with any benefit or coverage questions.  Any procedures must be performed at a [facility] facility OR coordinated by your clinic's referral office.    Please bring the following with you to your appointment:    (1) Any X-Rays, CTs or MRIs which have been performed.  Contact the facility where they were done to arrange for  prior to your scheduled appointment.    (2) List of current medications   (3) This referral request   (4) Any documents/labs given to you for this referral                  Follow-up notes from your care team     Return in about 6 months (around 4/24/2019)  "for Routine Visit.      Who to contact     If you have questions or need follow up information about today's clinic visit or your schedule please contact [facility] directly at [phone].  Normal or non-critical lab and imaging results will be communicated to you by MyChart, letter or phone within 4 business days after the clinic has received the results. If you do not hear from us within 7 days, please contact the clinic through MyChart or phone. If you have a critical or abnormal lab result, we will notify you by phone as soon as possible.  Submit refill requests through [employer] or call your pharmacy and they will forward the refill request to us. Please allow 3 business days for your refill to be completed.          Additional Information About Your Visit        Care EveryWhere ID     This is your Care EveryWhere ID. This could be used by other organizations to access your [facility] medical records  [id number]        Your Vitals Were     Pulse Temperature Respirations Height Pulse Oximetry BMI (Body Mass Index)    74 98.6  F (37  C) 24 5' 5.5\" (1.664 m) 96% 32.19 kg/m2       Blood Pressure from Last 3 Encounters:   10/24/18 160/84   10/15/18 (!) 150/100   12/28/17 130/82    Weight from Last 3 Encounters:   10/24/18 196 lb 6.4 oz (89.1 kg)   12/28/17 200 lb (90.7 kg)   12/06/17 194 lb 6.4 oz (88.2 kg)              We Performed the Following     CBC with platelets     Comprehensive metabolic panel (BMP + Alb, Alk Phos, ALT, AST, Total. Bili, TP)     GASTROENTEROLOGY ADULT REF PROCEDURE ONLY     TSH with free T4 reflex          Today's Medication Changes          These changes are accurate as of 10/24/18 11:04 AM.  If you have any questions, ask your nurse or doctor.               Start taking these medicines.        Dose/Directions    lisinopril 10 MG tablet   Commonly known as:  PRINIVIL/ZESTRIL   Used for:  Hypertension goal BP (blood pressure) < 140/90   Started by:  Anju Antonio APRN " CNP        Dose:  10 mg   Take 1 tablet (10 mg) by mouth daily   Quantity:  30 tablet   Refills:  1            Where to get your medicines      These medications were sent to [facility] [address] [place] [address] - [address]   [address] , [facility] [address]     Phone:  [phone]     lisinopril 10 MG tablet                Primary Care Provider Fax #    Physician No Ref-Primary [phone]       No address on file        Equal Access to Services     JEFFREY MCDOWELL : Hadii aad ku hadasho Soomaali, waaxda luqadaha, qaybta kaalmada adeegyada, waxay idiin hayaan adelinda khrosemariesh lairving . So [facility] [phone].    ATENCIÓN: Si habla español, tiene a schneider disposición servicios gratuitos de asistencia lingüística. Llame al [phone].    We comply with applicable federal civil rights laws and [place] laws. We do not discriminate on the basis of race, color, national origin, age, disability, sex, sexual orientation, or gender identity.            Thank you!     Thank you for choosing [facility]  for your care. Our goal is always to provide you with excellent care. Hearing back from our patients is one way we can continue to improve our services. Please take a few minutes to complete the written survey that you may receive in the mail after your visit with us. Thank you!             Your Updated Medication List - Protect others around you: Learn how to safely use, store and throw away your medicines at www.disposemymeds.org.          This list is accurate as of 10/24/18 11:04 AM.  Always use your most recent med list.                   Brand Name Dispense Instructions for use Diagnosis    ASPIRIN PO      Take 81 mg by mouth        lisinopril 10 MG tablet    PRINIVIL/ZESTRIL    30 tablet    Take 1 tablet (10 mg) by mouth daily    Hypertension goal BP (blood pressure) < 140/90       VITAMIN C PO           VITAMIN D PO

## 2018-10-24 NOTE — PATIENT INSTRUCTIONS
Start Lisinopril for your blood pressure     Will get lab work today as well - will call you with results     Schedule a nurse only visit in 2 weeks for a blood pressure check  - we will determine any changes from there - [phone] - ASK for family practice nurse visit       See me back in 6 months     The  will call you to schedule your colonoscopy in [place].

## 2018-10-24 NOTE — PROGRESS NOTES
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  Additional history: as documented    Patient Active Problem List   Diagnosis     Hyperlipidemia LDL goal <160     Advanced directives, counseling/discussion     History reviewed. No pertinent surgical history.    Social History   Substance Use Topics     Smoking status: Former Smoker     Quit date: 1/1/2001     Smokeless tobacco: Never Used     Alcohol use 16.8 oz/week     28 Cans of beer per week      Comment: 4-5 beers at night      Family History   Problem Relation Age of Onset     Cancer Father      Diabetes Sister      Diabetes Sister          Current Outpatient Prescriptions   Medication Sig Dispense Refill     Ascorbic Acid (VITAMIN C PO)        ASPIRIN PO Take 81 mg by mouth       Cholecalciferol (VITAMIN D PO)        lisinopril (PRINIVIL/ZESTRIL) 10 MG tablet Take 1 tablet (10 mg) by mouth daily 30 tablet 1     No Known Allergies    Reviewed and updated as needed this visit by clinical staff  Tobacco  Allergies  Meds  Problems  Med Hx  Surg Hx  Fam Hx  Soc Hx        Reviewed and updated as needed this visit  "by Provider  Tobacco  Allergies  Meds  Problems  Med Hx  Surg Hx  Fam Hx  Soc Hx          ROS:  Constitutional, HEENT, cardiovascular, pulmonary, gi and gu systems are negative, except as otherwise noted.    OBJECTIVE:     /84 (BP Location: Left arm)  Pulse 74  Temp 98.6  F (37  C)  Resp 24  Ht 5' 5.5\" (1.664 m)  Wt 196 lb 6.4 oz (89.1 kg)  SpO2 96%  BMI 32.19 kg/m2  Body mass index is 32.19 kg/(m^2).  GENERAL APPEARANCE: healthy, alert and no distress  EYES: Eyes grossly normal to inspection, PERRL and conjunctivae and sclerae normal  HENT: ear canals and TM's normal and nose and mouth without ulcers or lesions  NECK: no adenopathy, no asymmetry, masses, or scars and thyroid normal to palpation  RESP: lungs clear to auscultation - no rales, rhonchi or wheezes  CV: regular rates and rhythm, normal S1 S2, no S3 or S4 and no murmur, click or rub  ABDOMEN: soft, nontender, without hepatosplenomegaly or masses and bowel sounds normal  MS: extremities normal- no gross deformities noted  SKIN: no suspicious lesions or rashes  NEURO: Normal strength and tone, mentation intact and speech normal  PSYCH: mentation appears normal and affect normal/bright    Diagnostic Test Results:  Pending     ASSESSMENT/PLAN:     1. Hypertension goal BP (blood pressure) < 140/90  Patient with several elevated blood pressure's. No family history. Works on farm quite a bit still, is active. Denies any symptoms of headache, dizziness, lightheadedness, chest pain or pressure. Does admit to some shortness of breath and fatigue. Discussed blood pressure's with patient and patient okay with starting medication. Will also get lab work as well. Start Lisinopril daily, discussed side effects with patient. Patient to follow up in 2 weeks for a nurse only visit for blood pressure check, wants to see someone in [place] which is closer for him. This is okay. Patient to follow up then in 6 months as long as everything is okay.   - " Comprehensive metabolic panel (BMP + Alb, Alk Phos, ALT, AST, Total. Bili, TP)  - CBC with platelets  - TSH with free T4 reflex  - lisinopril (PRINIVIL/ZESTRIL) 10 MG tablet; Take 1 tablet (10 mg) by mouth daily  Dispense: 30 tablet; Refill: 1    2. Special screening for malignant neoplasms, colon    - GASTROENTEROLOGY ADULT REF PROCEDURE ONLY    Patient Instructions   Start Lisinopril for your blood pressure     Will get lab work today as well - will call you with results     Schedule a nurse only visit in 2 weeks for a blood pressure check  - we will determine any changes from there - [phone] - ASK for family practice nurse visit       See me back in 6 months     The  will call you to schedule your colonoscopy in [place].       MADDIE Fisher [facility]

## 2018-10-24 NOTE — NURSING NOTE
"Chief Complaint   Patient presents with     Hypertension       Initial There were no vitals taken for this visit. Estimated body mass index is 31.32 kg/(m^2) as calculated from the following:    Height as of 12/28/17: 5' 7\" (1.702 m).    Weight as of 12/28/17: 200 lb (90.7 kg).    Patient presents to the clinic using No DME    Health Maintenance that is potentially due pending provider review:  Colonoscopy/FIT    Gave pt phone number/pended order to schedule mammo and/or colonoscopy(or FIT)    Is there anyone who you would like to be able to receive your results? not asked  If yes have patient fill out LASHAUN    "

## 2018-10-29 PROBLEM — I10 HYPERTENSION GOAL BP (BLOOD PRESSURE) < 140/90: Status: ACTIVE | Noted: 2018-10-29

## 2018-11-07 ENCOUNTER — TELEPHONE (OUTPATIENT)
Dept: FAMILY MEDICINE | Facility: CLINIC | Age: 68
End: 2018-11-07

## 2018-11-07 NOTE — TELEPHONE ENCOUNTER
Left message with wife, needs BP check-wife said she will have him do that at [place], will postpone chart for 1 week to see if completes.

## 2018-11-07 NOTE — TELEPHONE ENCOUNTER
Could we contact patient please and make sure he gets into a nurse for a blood pressure check, he was suppose to make appointment with nurse in [place] as it's closer, but I don't see that he has and is due.     Thanks   MADDIE Watson CNP

## 2018-11-09 ENCOUNTER — ALLIED HEALTH/NURSE VISIT (OUTPATIENT)
Dept: FAMILY MEDICINE | Facility: CLINIC | Age: 68
End: 2018-11-09
Payer: COMMERCIAL

## 2018-11-09 VITALS — SYSTOLIC BLOOD PRESSURE: 140 MMHG | HEART RATE: 84 BPM | DIASTOLIC BLOOD PRESSURE: 80 MMHG

## 2018-11-09 DIAGNOSIS — I10 HYPERTENSION GOAL BP (BLOOD PRESSURE) < 140/90: Primary | ICD-10-CM

## 2018-11-09 PROCEDURE — 99207 ZZC NO CHARGE NURSE ONLY: CPT

## 2018-11-09 NOTE — PROGRESS NOTES
Magdaleno Dutton is a 68 year old patient who comes in today for a Blood Pressure check.  Initial BP:  /80 (Cuff Size: Adult Large)  Pulse 84     84  Disposition: follow-up as previously indicated by provider

## 2018-11-09 NOTE — Clinical Note
Please notify patient that I would like to increase his Lisinopril to 20 mg daily. I put in a new prescription for this. Would like him to follow up for a repeat blood pressure check, he can go to [place] again for this.  Thanks MADDIE Watson CNP

## 2018-11-09 NOTE — MR AVS SNAPSHOT
After Visit Summary   11/9/2018    Magdaleno Dutton    MRN: [id number]           Patient Information     Date Of Birth          1950        Visit Information        Provider Department      11/9/2018 11:00 AM MATTY BRYANT MA [facility]        Today's Diagnoses     Hypertension goal BP (blood pressure) < 140/90    -  1       Follow-ups after your visit        Who to contact     If you have questions or need follow up information about today's clinic visit or your schedule please contact [facility] directly at [phone].  Normal or non-critical lab and imaging results will be communicated to you by MyChart, letter or phone within 4 business days after the clinic has received the results. If you do not hear from us within 7 days, please contact the clinic through MyChart or phone. If you have a critical or abnormal lab result, we will notify you by phone as soon as possible.  Submit refill requests through [employer] or call your pharmacy and they will forward the refill request to us. Please allow 3 business days for your refill to be completed.          Additional Information About Your Visit        Care EveryWhere ID     This is your Care EveryWhere ID. This could be used by other organizations to access your [facility] medical records  [id number]        Your Vitals Were     Pulse                   84            Blood Pressure from Last 3 Encounters:   11/09/18 140/80   10/24/18 160/84   10/15/18 (!) 150/100    Weight from Last 3 Encounters:   10/24/18 196 lb 6.4 oz (89.1 kg)   12/28/17 200 lb (90.7 kg)   12/06/17 194 lb 6.4 oz (88.2 kg)              Today, you had the following     No orders found for display       Primary Care Provider Fax #    Physician No Ref-Primary [phone]       No address on file        Equal Access to Services     JEFFREY MCDOWELL AH: Armin Simons, mary khanna, eddie correa  lairving paniagua. So [facility] [phone].    ATENCIÓN: Si habla cassidy, tiene a schneider disposición servicios gratuitos de asistencia lingüística. Colin al [phone].    We comply with applicable federal civil rights laws and [place] laws. We do not discriminate on the basis of race, color, national origin, age, disability, sex, sexual orientation, or gender identity.            Thank you!     Thank you for choosing [facility]  for your care. Our goal is always to provide you with excellent care. Hearing back from our patients is one way we can continue to improve our services. Please take a few minutes to complete the written survey that you may receive in the mail after your visit with us. Thank you!             Your Updated Medication List - Protect others around you: Learn how to safely use, store and throw away your medicines at www.disposemymeds.org.          This list is accurate as of 11/9/18 11:00 AM.  Always use your most recent med list.                   Brand Name Dispense Instructions for use Diagnosis    ASPIRIN PO      Take 81 mg by mouth        lisinopril 10 MG tablet    PRINIVIL/ZESTRIL    30 tablet    Take 1 tablet (10 mg) by mouth daily    Hypertension goal BP (blood pressure) < 140/90       VITAMIN C PO           VITAMIN D PO

## 2018-11-12 RX ORDER — LISINOPRIL 20 MG/1
20 TABLET ORAL DAILY
Qty: 30 TABLET | Refills: 1 | Status: SHIPPED | OUTPATIENT
Start: 2018-11-12 | End: 2019-01-05

## 2018-11-12 NOTE — TELEPHONE ENCOUNTER
Hypertension goal BP (blood pressure) < 140/90   Dx    Hypertension   Reason for visit    Progress Notes   Belen Mott MA (Medical Assistant)      Magdaleno Dutton is a 68 year old patient who comes in today for a Blood Pressure check.  Initial BP:  /80 (Cuff Size: Adult Large)  Pulse 84     84  Disposition: follow-up as previously indicated by provider

## 2018-11-21 ENCOUNTER — ALLIED HEALTH/NURSE VISIT (OUTPATIENT)
Dept: FAMILY MEDICINE | Facility: CLINIC | Age: 68
End: 2018-11-21
Payer: COMMERCIAL

## 2018-11-21 VITALS — SYSTOLIC BLOOD PRESSURE: 140 MMHG | DIASTOLIC BLOOD PRESSURE: 82 MMHG

## 2018-11-21 DIAGNOSIS — I10 HYPERTENSION GOAL BP (BLOOD PRESSURE) < 140/90: Primary | ICD-10-CM

## 2018-11-21 PROCEDURE — 99207 ZZC NO CHARGE NURSE ONLY: CPT

## 2018-11-21 NOTE — MR AVS SNAPSHOT
After Visit Summary   11/21/2018    Magdaleno Dutton    MRN: [id number]           Patient Information     Date Of Birth          1950        Visit Information        Provider Department      11/21/2018 9:00 AM [facility]        Today's Diagnoses     Hypertension goal BP (blood pressure) < 140/90    -  1       Follow-ups after your visit        Your next 10 appointments already scheduled     Nov 21, 2018  9:00 AM CST   Nurse Only with [facility] ([facility])    [address] [place] [id number] [phone]              Who to contact     If you have questions or need follow up information about today's clinic visit or your schedule please contact [facility] directly at [phone].  Normal or non-critical lab and imaging results will be communicated to you by MyChart, letter or phone within 4 business days after the clinic has received the results. If you do not hear from us within 7 days, please contact the clinic through MyChart or phone. If you have a critical or abnormal lab result, we will notify you by phone as soon as possible.  Submit refill requests through [employer] or call your pharmacy and they will forward the refill request to us. Please allow 3 business days for your refill to be completed.          Additional Information About Your Visit        Care EveryWhere ID     This is your Care EveryWhere ID. This could be used by other organizations to access your [place] medical records  [id number]         Blood Pressure from Last 3 Encounters:   11/21/18 140/82   11/09/18 140/80   10/24/18 160/84    Weight from Last 3 Encounters:   10/24/18 196 lb 6.4 oz (89.1 kg)   12/28/17 200 lb (90.7 kg)   12/06/17 194 lb 6.4 oz (88.2 kg)              Today, you had the following     No orders found for display       Primary Care Provider Fax #    Physician No Ref-Primary [phone]       No  address on file        Equal Access to Services     [facility] JEREMIAS : Hadii aad ku hadjuice Simons, warichie chaloguerda, sosa chidiiamrachelle mooresunitha giorgi almazanrosemariedesirae paniagua. So [facility] [phone].    ATENCIÓN: Si habla español, tiene a schneider disposición servicios gratuitos de asistencia lingüística. Llame al [phone].    We comply with applicable federal civil rights laws and [place] laws. We do not discriminate on the basis of race, color, national origin, age, disability, sex, sexual orientation, or gender identity.            Thank you!     Thank you for choosing [facility]  for your care. Our goal is always to provide you with excellent care. Hearing back from our patients is one way we can continue to improve our services. Please take a few minutes to complete the written survey that you may receive in the mail after your visit with us. Thank you!             Your Updated Medication List - Protect others around you: Learn how to safely use, store and throw away your medicines at www.disposemymeds.org.          This list is accurate as of 11/21/18  8:53 AM.  Always use your most recent med list.                   Brand Name Dispense Instructions for use Diagnosis    ASPIRIN PO      Take 81 mg by mouth        lisinopril 20 MG tablet    PRINIVIL/ZESTRIL    30 tablet    Take 1 tablet (20 mg) by mouth daily    Hypertension goal BP (blood pressure) < 140/90       VITAMIN C PO           VITAMIN D PO

## 2018-11-21 NOTE — PROGRESS NOTES
Magdaleno Dutton is a 68 year old year old patient who comes in today for a Blood Pressure check because of new medication.    Patient is taking medication as prescribed  Patient is tolerating medications well. Since the increase to 20 MG pt reports feeling tired. Not other side effects noted.   Patient is not monitoring Blood Pressure at home.   Current complaints: none

## 2019-01-05 DIAGNOSIS — I10 HYPERTENSION GOAL BP (BLOOD PRESSURE) < 140/90: ICD-10-CM

## 2019-01-06 NOTE — TELEPHONE ENCOUNTER
"Requested Prescriptions   Pending Prescriptions Disp Refills     lisinopril (PRINIVIL/ZESTRIL) 20 MG tablet   Last Written Prescription Date:  11/12/18  Last Fill Quantity: 30,  # refills: 1   Last office visit: 10/24/2018 with prescribing provider:  SALAS Antonio   Future Office Visit:     30 tablet 1     Sig: TAKE 1 TABLET BY MOUTH ONCE DAILY    ACE Inhibitors (Including Combos) Protocol Failed - 1/5/2019  2:06 PM       Failed - Blood pressure under 140/90 in past 12 months    BP Readings from Last 3 Encounters:   11/21/18 140/82   11/09/18 140/80   10/24/18 160/84                Passed - Recent (12 mo) or future (30 days) visit within the authorizing provider's specialty    Patient had office visit in the last 12 months or has a visit in the next 30 days with authorizing provider or within the authorizing provider's specialty.  See \"Patient Info\" tab in inbasket, or \"Choose Columns\" in Meds & Orders section of the refill encounter.             Passed - Medication is active on med list       Passed - Patient is age 18 or older       Passed - Normal serum creatinine on file in past 12 months    Recent Labs   Lab Test 10/24/18  1110   CR 0.67            Passed - Normal serum potassium on file in past 12 months    Recent Labs   Lab Test 10/24/18  1110   POTASSIUM 4.0               "

## 2019-01-07 RX ORDER — LISINOPRIL 20 MG/1
TABLET ORAL
Qty: 30 TABLET | Refills: 0 | Status: SHIPPED | OUTPATIENT
Start: 2019-01-07 | End: 2019-02-11

## 2019-02-11 DIAGNOSIS — I10 HYPERTENSION GOAL BP (BLOOD PRESSURE) < 140/90: ICD-10-CM

## 2019-02-11 RX ORDER — LISINOPRIL 20 MG/1
20 TABLET ORAL DAILY
Qty: 90 TABLET | Refills: 0 | Status: SHIPPED | OUTPATIENT
Start: 2019-02-11 | End: 2019-04-12

## 2019-02-11 NOTE — TELEPHONE ENCOUNTER
"Requested Prescriptions   Pending Prescriptions Disp Refills     lisinopril (PRINIVIL/ZESTRIL) 20 MG tablet [Pharmacy Med Name: LISINOPRIL 20MG     TAB] 30 tablet      Sig: TAKE 1 TABLET BY MOUTH ONCE DAILY    ACE Inhibitors (Including Combos) Protocol Failed - 2/11/2019  1:03 PM       Failed - Blood pressure under 140/90 in past 12 months    BP Readings from Last 3 Encounters:   11/21/18 140/82   11/09/18 140/80   10/24/18 160/84                Passed - Recent (12 mo) or future (30 days) visit within the authorizing provider's specialty    Patient had office visit in the last 12 months or has a visit in the next 30 days with authorizing provider or within the authorizing provider's specialty.  See \"Patient Info\" tab in inbasket, or \"Choose Columns\" in Meds & Orders section of the refill encounter.             Passed - Medication is active on med list       Passed - Patient is age 18 or older       Passed - Normal serum creatinine on file in past 12 months    Recent Labs   Lab Test 10/24/18  1110   CR 0.67            Passed - Normal serum potassium on file in past 12 months    Recent Labs   Lab Test 10/24/18  1110   POTASSIUM 4.0             Last Written Prescription Date:  1/7/19  Last Fill Quantity: 30,  # refills: 0   Last office visit: 11/21/2018 with prescribing provider:     Future Office Visit:      "

## 2019-02-26 ENCOUNTER — TELEPHONE (OUTPATIENT)
Dept: FAMILY MEDICINE | Facility: CLINIC | Age: 69
End: 2019-02-26

## 2019-02-26 NOTE — LETTER
[address] [address]  [phone]        February 26, 2019  Magdaleno Dutton  [address]  [place] [address]    Dear Magdaleno,    I care about your health and have reviewed your health plan. I have reviewed your medical conditions, medication list, and lab results and am making recommendations based on this review, to better manage your health.    You are in particular need of attention regarding:  -Wellness (Physical) Visit     I am recommending that you:  -schedule a WELLNESS (Physical) APPOINTMENT with me.      -Schedule your colonoscopy in [facility] [phone].    Here is a list of Health Maintenance topics that are due now or due soon:  Health Maintenance Due   Topic Date Due     COLON CANCER SCREEN (SYSTEM ASSIGNED)  01/29/2001     ZOSTER IMMUNIZATION (1 of 2) 01/29/2001     MEDICARE ANNUAL WELLNESS VISIT  12/28/2018   Please call us at [phone] (or use [employer]) to address the above recommendations.     On review of your electronic health record, your last blood pressure check was elevated.    BP Readings from Last 3 Encounters:   11/21/18 140/82   11/09/18 140/80   10/24/18 160/84   Please schedule a nurse only appointment at your nearest [facility] to have your blood pressure checked. You may also have your Blood Pressure checked at a [facility] [place] pharmacy.                  Thank you for trusting [facility] and we appreciate the opportunity to serve you.  We look forward to supporting your healthcare needs in the future.    Healthy Regards,    Anju PERKINS-ALBA/jolanta

## 2019-02-26 NOTE — TELEPHONE ENCOUNTER
Panel Management Review      Patient has the following on his problem list:     Hypertension   Last three blood pressure readings:  BP Readings from Last 3 Encounters:   11/21/18 140/82   11/09/18 140/80   10/24/18 160/84     Blood pressure: FAILED    HTN Guidelines:  Age 18-59 BP range:  Less than 140/90  Age 60-85 with Diabetes:  Less than 140/90  Age 60-85 without Diabetes:  less than 150/90      Composite cancer screening  Chart review shows that this patient is due/due soon for the following Colonoscopy  Summary:    Patient is due/failing the following:   BP CHECK and COLONOSCOPY    Action needed:   Patient needs office visit for physical.    Type of outreach:    Sent letter.    Questions for provider review:    None                                                                                                                                    Jolanta RM [facility]       Chart routed to Care Team .

## 2019-04-05 ENCOUNTER — TELEPHONE (OUTPATIENT)
Dept: FAMILY MEDICINE | Facility: CLINIC | Age: 69
End: 2019-04-05

## 2019-04-05 NOTE — LETTER
[address] [address]  [phone]        Magdaleno VANCE Tustin                                                               Date: 4/5/2019  [address]  [place] [address]      Dear Magdaleno,    In order to ensure we are providing the best quality care, we have reviewed your chart and see that you are due for:  1.     BP Readings from Last 3 Encounters:   11/21/18 140/82   11/09/18 140/80   10/24/18 160/84   BP is much better but goal is less than 140/90    On review of your electronic health record, your last blood pressure check was elevated.  Please schedule a nurse only appointment at your nearest [facility] to have your blood pressure checked. You may also have your Blood Pressure checked at a [place] pharmacy.  2.   Colonoscopy  3.   Routine wellness visit (medicare pays for)    Please call the clinic at your earliest convenience to schedule an appointment.    We greatly appreciate the opportunity to serve you.  Thank you for trusting us with your health care.      Sincerely,  Anju PERKINS-ALBA/jolanta

## 2019-04-05 NOTE — TELEPHONE ENCOUNTER
Panel Management Review      Patient has the following on his problem list:     Hypertension   Last three blood pressure readings:  BP Readings from Last 3 Encounters:   11/21/18 140/82   11/09/18 140/80   10/24/18 160/84     Blood pressure: FAILED    HTN Guidelines:  Age 18-59 BP range:  Less than 140/90  Age 60-85 with Diabetes:  Less than 140/90  Age 60-85 without Diabetes:  less than 150/90      Composite cancer screening  Chart review shows that this patient is due/due soon for the following Colonoscopy  Summary:    Patient is due/failing the following:   COLONOSCOPY    Action needed:   Patient needs office visit for physical.    Type of outreach:    Sent letter.    Questions for provider review:    None                                                                                                                                    Jolanta RM CMA       Chart routed to Care Team .

## 2019-04-12 ENCOUNTER — OFFICE VISIT (OUTPATIENT)
Dept: FAMILY MEDICINE | Facility: CLINIC | Age: 69
End: 2019-04-12
Payer: COMMERCIAL

## 2019-04-12 VITALS
RESPIRATION RATE: 18 BRPM | WEIGHT: 197.7 LBS | HEART RATE: 62 BPM | DIASTOLIC BLOOD PRESSURE: 86 MMHG | SYSTOLIC BLOOD PRESSURE: 128 MMHG | BODY MASS INDEX: 32.94 KG/M2 | OXYGEN SATURATION: 97 % | TEMPERATURE: 97.5 F | HEIGHT: 65 IN

## 2019-04-12 DIAGNOSIS — K13.0 CHAPPED LIPS: ICD-10-CM

## 2019-04-12 DIAGNOSIS — Z00.00 ROUTINE GENERAL MEDICAL EXAMINATION AT A HEALTH CARE FACILITY: Primary | ICD-10-CM

## 2019-04-12 DIAGNOSIS — I10 HYPERTENSION GOAL BP (BLOOD PRESSURE) < 140/90: ICD-10-CM

## 2019-04-12 PROCEDURE — 99397 PER PM REEVAL EST PAT 65+ YR: CPT | Performed by: NURSE PRACTITIONER

## 2019-04-12 RX ORDER — LISINOPRIL 20 MG/1
20 TABLET ORAL DAILY
Qty: 90 TABLET | Refills: 1 | Status: SHIPPED | OUTPATIENT
Start: 2019-04-12 | End: 2019-11-11

## 2019-04-12 RX ORDER — MUPIROCIN 20 MG/G
OINTMENT TOPICAL 3 TIMES DAILY
Qty: 15 G | Refills: 0 | Status: SHIPPED | OUTPATIENT
Start: 2019-04-12 | End: 2020-09-23

## 2019-04-12 SDOH — HEALTH STABILITY: MENTAL HEALTH: HOW OFTEN DO YOU HAVE A DRINK CONTAINING ALCOHOL?: 4 OR MORE TIMES A WEEK

## 2019-04-12 SDOH — HEALTH STABILITY: MENTAL HEALTH: HOW OFTEN DO YOU HAVE 6 OR MORE DRINKS ON ONE OCCASION?: NEVER

## 2019-04-12 SDOH — HEALTH STABILITY: MENTAL HEALTH: HOW MANY STANDARD DRINKS CONTAINING ALCOHOL DO YOU HAVE ON A TYPICAL DAY?: 1 OR 2

## 2019-04-12 ASSESSMENT — MIFFLIN-ST. JEOR: SCORE: 1597.6

## 2019-04-12 ASSESSMENT — PAIN SCALES - GENERAL: PAINLEVEL: NO PAIN (0)

## 2019-04-12 NOTE — PROGRESS NOTES
"  SUBJECTIVE:   Magdaleno Dutton is a 69 year old male who presents for Preventive Visit.    Are you in the first 12 months of your Medicare Part B coverage?  No    Physical Health:    In general, how would you rate your overall physical health? 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 No    Do you usually eat at least 4 servings of fruit and vegetables a day, include whole grains & fiber and avoid regularly eating high fat or \"junk\" foods? NO    Do you have any problems taking medications regularly?  No    Do you have any side effects from medications? none    Needs assistance for the following daily activities: no assistance needed    Which of the following safety concerns are present in your home?  throw rugs in the hallway     Hearing impairment: Yes, wears hearing aids    In the past 6 months, have you been bothered by leaking of urine? Yes occasionally    Mental Health:    In general, how would you rate your overall mental or emotional health? excellent  PHQ-2 Score:      Do you feel safe in your environment? Yes    Do you have a Health Care Directive? No: Advance care planning reviewed with patient; information given to patient to review.    Additional concerns to address?  No    Fall risk:  Fallen 2 or more times in the past year?: No  Any fall with injury in the past year?: No    Cognitive Screenin) Repeat 3 items (Leader, Season, Table)    2) Clock draw: NORMAL  3) 3 item recall: Recalls 3 objects  Results: 3 items recalled: COGNITIVE IMPAIRMENT LESS LIKELY    Mini-CogTM Copyright ROEL Potts. Licensed by the author for use in [facility]; reprinted with permission (fracisco@.[facility]). All rights reserved.      Do you have sleep apnea, excessive snoring or daytime drowsiness?: " yes wife said he snores      Reviewed and updated as needed this visit by clinical staff  Allergies  Meds         Reviewed and updated as needed this visit by Provider  Tobacco  Allergies  Meds  Problems  Med Hx  Surg Hx  Fam Hx  Soc Hx         Social History     Tobacco Use     Smoking status: Former Smoker     Last attempt to quit: 2001     Years since quittin.2     Smokeless tobacco: Never Used   Substance Use Topics     Alcohol use: Yes     Alcohol/week: 16.8 oz     Types: 28 Cans of beer per week     Frequency: 4 or more times a week     Drinks per session: 1 or 2     Binge frequency: Never     Comment: 4-5 beers at night                            Current providers sharing in care for this patient include:   Patient Care Team:  No Ref-Primary, Physician as PCP - Anju Merida APRN CNP as Assigned PCP    The following health maintenance items are reviewed in Epic and correct as of today:  Health Maintenance   Topic Date Due     COLON CANCER SCREEN (SYSTEM ASSIGNED)  2001     ZOSTER IMMUNIZATION (1 of 2) 2001     MEDICARE ANNUAL WELLNESS VISIT  2018     FALL RISK ASSESSMENT  10/24/2019     PHQ-2 Q1 YR  10/24/2019     ADVANCE DIRECTIVE PLANNING Q5 YRS  2021     LIPID SCREEN Q5 YR MALE (SYSTEM ASSIGNED)  2022     DTAP/TDAP/TD IMMUNIZATION (2 - Td) 2026     INFLUENZA VACCINE  Completed     AORTIC ANEURYSM SCREENING (SYSTEM ASSIGNED)  Completed     HEPATITIS C SCREENING  Completed     IPV IMMUNIZATION  Aged Out     MENINGITIS IMMUNIZATION  Aged Out     Labs reviewed in EPIC  BP Readings from Last 3 Encounters:   19 128/86   18 140/82   18 140/80    Wt Readings from Last 3 Encounters:   19 89.7 kg (197 lb 11.2 oz)   10/24/18 89.1 kg (196 lb 6.4 oz)   17 90.7 kg (200 lb)                  Patient Active Problem List   Diagnosis     Hyperlipidemia LDL goal <160     Advanced directives, counseling/discussion      "Hypertension goal BP (blood pressure) < 140/90     History reviewed. No pertinent surgical history.    Social History     Tobacco Use     Smoking status: Former Smoker     Last attempt to quit: 2001     Years since quittin.2     Smokeless tobacco: Never Used   Substance Use Topics     Alcohol use: Yes     Alcohol/week: 16.8 oz     Types: 28 Cans of beer per week     Frequency: 4 or more times a week     Drinks per session: 1 or 2     Binge frequency: Never     Comment: 4-5 beers at night      Family History   Problem Relation Age of Onset     Cancer Father      Diabetes Sister      Diabetes Sister          Current Outpatient Medications   Medication Sig Dispense Refill     Ascorbic Acid (VITAMIN C PO)        ASPIRIN PO Take 81 mg by mouth       Cholecalciferol (VITAMIN D PO)        lisinopril (PRINIVIL/ZESTRIL) 20 MG tablet Take 1 tablet (20 mg) by mouth daily 90 tablet 1     mupirocin (BACTROBAN) 2 % external ointment Apply topically 3 times daily 15 g 0     No Known Allergies    ROS:  Constitutional, HEENT, cardiovascular, pulmonary, GI, , musculoskeletal, neuro, skin, endocrine and psych systems are negative, except as otherwise noted.    OBJECTIVE:   /86   Pulse 62   Temp 97.5  F (36.4  C)   Resp 18   Ht 1.657 m (5' 5.25\")   Wt 89.7 kg (197 lb 11.2 oz)   SpO2 97%   BMI 32.65 kg/m   Estimated body mass index is 32.65 kg/m  as calculated from the following:    Height as of this encounter: 1.657 m (5' 5.25\").    Weight as of this encounter: 89.7 kg (197 lb 11.2 oz).  EXAM:   GENERAL: healthy, alert and no distress  EYES: Eyes grossly normal to inspection, PERRL and conjunctivae and sclerae normal  HENT: ear canals and TM's normal, nose and mouth without ulcers or lesions  NECK: no adenopathy, no asymmetry, masses, or scars and thyroid normal to palpation  RESP: lungs clear to auscultation - no rales, rhonchi or wheezes  CV: regular rate and rhythm, normal S1 S2, no S3 or S4, no murmur, click " "or rub, no peripheral edema and peripheral pulses strong  ABDOMEN: soft, nontender, no hepatosplenomegaly, no masses and bowel sounds normal   (male): normal male genitalia without lesions or urethral discharge, no hernia  MS: no gross musculoskeletal defects noted, no edema  SKIN: no suspicious lesions or rashes  NEURO: Normal strength and tone, mentation intact and speech normal  PSYCH: mentation appears normal, affect normal/bright    Diagnostic Test Results:  none     ASSESSMENT / PLAN:   1. Routine general medical examination at a health care facility  Generally healthy 69-year-old male with no acute complaints.    2. Hypertension goal BP (blood pressure) < 140/90  Chronic, stable.  Blood pressure within goal.  Continue current medications, will recheck labs at next routine office visit.  Last comprehensive in October within normal limits.  - lisinopril (PRINIVIL/ZESTRIL) 20 MG tablet; Take 1 tablet (20 mg) by mouth daily  Dispense: 90 tablet; Refill: 1    3. Chapped lips  Patient has used Bactroban for tablets in the past with good relief.  Will refill.  - mupirocin (BACTROBAN) 2 % external ointment; Apply topically 3 times daily  Dispense: 15 g; Refill: 0    End of Life Planning:  Patient currently has an advanced directive: No.  I have verified the patient's ablity to prepare an advanced directive/make health care decisions.  Literature was provided to assist patient in preparing an advanced directive.    COUNSELING:  Reviewed preventive health counseling, as reflected in patient instructions       Regular exercise       Healthy diet/nutrition       Immunizations    Will consider Zoster in the future         BP Readings from Last 1 Encounters:   04/12/19 128/86     Estimated body mass index is 32.65 kg/m  as calculated from the following:    Height as of this encounter: 1.657 m (5' 5.25\").    Weight as of this encounter: 89.7 kg (197 lb 11.2 oz).      Weight management plan: Discussed healthy diet and " exercise guidelines     reports that he quit smoking about 18 years ago. He has never used smokeless tobacco.      Appropriate preventive services were discussed with this patient, including applicable screening as appropriate for cardiovascular disease, diabetes, osteopenia/osteoporosis, and glaucoma.  As appropriate for age/gender, discussed screening for colorectal cancer, prostate cancer, breast cancer, and cervical cancer. Checklist reviewing preventive services available has been given to the patient.    Reviewed patients plan of care and provided an AVS. The Basic Care Plan (routine screening as documented in Health Maintenance) for Magdaleno meets the Care Plan requirement. This Care Plan has been established and reviewed with the Patient.    Counseling Resources:  ATP IV Guidelines  Pooled Cohorts Equation Calculator  Breast Cancer Risk Calculator  FRAX Risk Assessment  ICSI Preventive Guidelines  Dietary Guidelines for Americans, 2010  USDA's MyPlate  ASA Prophylaxis  Lung CA Screening    MADDIE Fisher CNP  [facility]

## 2019-04-12 NOTE — NURSING NOTE
"Chief Complaint   Patient presents with     Physical       Initial There were no vitals taken for this visit. Estimated body mass index is 32.19 kg/m  as calculated from the following:    Height as of 10/24/18: 1.664 m (5' 5.5\").    Weight as of 10/24/18: 89.1 kg (196 lb 6.4 oz).    Patient presents to the clinic using No DME    Health Maintenance that is potentially due pending provider review:  Colonoscopy/FIT    Patient has declined in the past.    Is there anyone who you would like to be able to receive your results? not asked  If yes have patient fill out LASHAUN    "

## 2019-08-30 ENCOUNTER — OFFICE VISIT (OUTPATIENT)
Dept: FAMILY MEDICINE | Facility: CLINIC | Age: 69
End: 2019-08-30
Payer: COMMERCIAL

## 2019-08-30 VITALS
OXYGEN SATURATION: 98 % | HEART RATE: 84 BPM | DIASTOLIC BLOOD PRESSURE: 76 MMHG | WEIGHT: 197 LBS | TEMPERATURE: 97.7 F | SYSTOLIC BLOOD PRESSURE: 122 MMHG | BODY MASS INDEX: 32.53 KG/M2

## 2019-08-30 DIAGNOSIS — J02.9 SORE THROAT: Primary | ICD-10-CM

## 2019-08-30 LAB
DEPRECATED S PYO AG THROAT QL EIA: NORMAL
SPECIMEN SOURCE: NORMAL

## 2019-08-30 PROCEDURE — 87880 STREP A ASSAY W/OPTIC: CPT | Performed by: FAMILY MEDICINE

## 2019-08-30 PROCEDURE — 99213 OFFICE O/P EST LOW 20 MIN: CPT | Performed by: FAMILY MEDICINE

## 2019-08-30 PROCEDURE — 87081 CULTURE SCREEN ONLY: CPT | Performed by: FAMILY MEDICINE

## 2019-08-30 ASSESSMENT — PAIN SCALES - GENERAL: PAINLEVEL: MODERATE PAIN (4)

## 2019-08-30 NOTE — PROGRESS NOTES
SUBJECTIVE: Magdaleno Dutton is a 69 year old male  Chief Complaint   Patient presents with     Throat Problem      does not have a sore throat but has some pain only with swallowing- started suddenly last night.  has been taking Benadryl as needed for hayfever sx.      ENT Symptoms  Location: posterior oropharynx.   He has allergies this time of year.    Some postnasal drainage.     Onset of symptoms: yesterday.   HE takes Benadryl prn.    Nasal congestion off and on.  Not much sneezing or itchy nose.   Eyes water and itch.              Symptoms: cc Present Absent Comment   Fever/Chills   x    Fatigue   x    Muscle Aches   x    Eye Irritation  x     Sneezing  x     Nasal Linden/Drg  x  Some drainage down his throat too   Sinus Pressure/Pain   x    Loss of smell   x    Dental pain   x    Sore Throat  x  Throat pain with swallowing   Swollen Glands   x    Ear Pain/Fullness   x    Cough   x    Wheeze   x    Chest Pain   x    Shortness of breath   x    Rash   x    Other         Symptom duration:  suddenly last night- pain with swallowing but able to swallow food   Symptom severity:  moderate   Treatments tried:  none   Contacts:  none     No tobacco.  Quit about 2002    Patient Active Problem List   Diagnosis     Hyperlipidemia LDL goal <160     Advanced directives, counseling/discussion     Hypertension goal BP (blood pressure) < 140/90      OBJECTIVE:Blood pressure 122/76, pulse 84, temperature 97.7  F (36.5  C), temperature source Tympanic, weight 89.4 kg (197 lb), SpO2 98 %. BMI=Body mass index is 32.53 kg/m .  GENERAL APPEARANCE ADULT: Alert, no acute distress  EYES: PERRL, EOM normal, conjunctiva and lids normal  HENT: right TM normal, left TM normal, oral mucous membranes moist, oropharynx clear  NECK: No adenopathy,masses or thyromegaly   Rapid strep: negative     ASSESSMENT:   (J02.9) Sore throat  (primary encounter diagnosis)  Comment: I think this is throat irritation and not infection.  We will  check for strep today.  I think more likely allergies.   Plan: Strep, Rapid Screen          Typical allergy symptoms include nasal symptoms like congestion, runny nose, itchy nose and sneezing.  Eye symptoms can occur like itching, watering and redness.   Options for treating allergies including topical treatments like steroid nasal sprays and eye drops, or antihistamines taken by mouth.  Many antihistamines are available both as over the counter medication and prescription.  No one is better than the others but some people find one works better for them than others or causes more side effects.   Some are dosed only once a day should not causes drowsiness and some have the potential to cause drowsiness and need to be dosed more than once daily.   Prescription nasal sprays would be an alternative treatment and may be a little more effective than the antihistamine pills.  Both types of medications could be used together.    Over the counter medication antihistamines include cetirizine (Zyrtec), fexofenadine (Allegra) and  loratidine (Claritin) which are once daily and should not cause drowsiness. Other choices include clemastine (Tavist), chlorpheneramine, and diphenhydramine (Benadryl) which often are less expensive but can sometimes cause drowsiness and need to be dosed more than once daily.      Flonase nasal spray can also help.  Use Flonase nasal spray, 2 sprays in each nostril once daily.  It may take several days to a week or more to notice a benefit.

## 2019-08-30 NOTE — LETTER
September 3, 2019      Magdaleno Dutton  [address] [place] [facility] [id number]        Dear Magdaleno,         This letter is to inform you that the results of your recent throat culture are negative.  If you have any questions please call or make an appointment.          Sincerely,        Magdaleno Gómez MD/ leonora

## 2019-08-30 NOTE — PATIENT INSTRUCTIONS
ASSESSMENT:   (J02.9) Sore throat  (primary encounter diagnosis)  Comment: I think this is throat irritation and not infection.  We will check for strep today.  I think more likely allergies.   Plan: Strep, Rapid Screen          Typical allergy symptoms include nasal symptoms like congestion, runny nose, itchy nose and sneezing.  Eye symptoms can occur like itching, watering and redness.   Options for treating allergies including topical treatments like steroid nasal sprays and eye drops, or antihistamines taken by mouth.  Many antihistamines are available both as over the counter medication and prescription.  No one is better than the others but some people find one works better for them than others or causes more side effects.   Some are dosed only once a day should not causes drowsiness and some have the potential to cause drowsiness and need to be dosed more than once daily.   Prescription nasal sprays would be an alternative treatment and may be a little more effective than the antihistamine pills.  Both types of medications could be used together.    Over the counter medication antihistamines include cetirizine (Zyrtec), fexofenadine (Allegra) and  loratidine (Claritin) which are once daily and should not cause drowsiness. Other choices include clemastine (Tavist), chlorpheneramine, and diphenhydramine (Benadryl) which often are less expensive but can sometimes cause drowsiness and need to be dosed more than once daily.      Flonase nasal spray can also help.  Use Flonase nasal spray, 2 sprays in each nostril once daily.  It may take several days to a week or more to notice a benefit.

## 2019-08-30 NOTE — NURSING NOTE
"Chief Complaint   Patient presents with     Throat Problem     States does not have a sore throat but has some pain only with swallowing- started suddenly last night. States has been taking Benadryl as needed for hayfever sx.       Initial /76 (BP Location: Right arm, Patient Position: Chair, Cuff Size: Adult Large)   Pulse 84   Temp 97.7  F (36.5  C) (Tympanic)   Wt 89.4 kg (197 lb)   SpO2 98%   BMI 32.53 kg/m   Estimated body mass index is 32.53 kg/m  as calculated from the following:    Height as of 4/12/19: 1.657 m (5' 5.25\").    Weight as of this encounter: 89.4 kg (197 lb).    Patient presents to the clinic using No DME    Health Maintenance that is potentially due pending provider review:  Colonoscopy/FIT    Gave pt phone number/pended order to schedule mammo and/or colonoscopy(or FIT) States may consider FIT test but will discuss with his primary Joan Antonio    Is there anyone who you would like to be able to receive your results? Yes  If yes have patient fill out LASHAUN  Nicolle Clemons CMA    "

## 2019-08-31 LAB
BACTERIA SPEC CULT: NORMAL
SPECIMEN SOURCE: NORMAL

## 2019-10-04 ENCOUNTER — IMMUNIZATION (OUTPATIENT)
Dept: FAMILY MEDICINE | Facility: CLINIC | Age: 69
End: 2019-10-04
Payer: COMMERCIAL

## 2019-10-04 PROCEDURE — G0008 ADMIN INFLUENZA VIRUS VAC: HCPCS

## 2019-10-04 PROCEDURE — 90662 IIV NO PRSV INCREASED AG IM: CPT

## 2019-11-11 DIAGNOSIS — I10 HYPERTENSION GOAL BP (BLOOD PRESSURE) < 140/90: ICD-10-CM

## 2019-11-11 RX ORDER — LISINOPRIL 20 MG/1
TABLET ORAL
Qty: 90 TABLET | Refills: 0 | Status: SHIPPED | OUTPATIENT
Start: 2019-11-11 | End: 2020-01-13

## 2020-01-13 ENCOUNTER — OFFICE VISIT (OUTPATIENT)
Dept: FAMILY MEDICINE | Facility: CLINIC | Age: 70
End: 2020-01-13
Payer: COMMERCIAL

## 2020-01-13 ENCOUNTER — TELEPHONE (OUTPATIENT)
Dept: FAMILY MEDICINE | Facility: CLINIC | Age: 70
End: 2020-01-13

## 2020-01-13 VITALS
HEART RATE: 72 BPM | HEIGHT: 66 IN | OXYGEN SATURATION: 96 % | DIASTOLIC BLOOD PRESSURE: 68 MMHG | BODY MASS INDEX: 29.73 KG/M2 | RESPIRATION RATE: 18 BRPM | TEMPERATURE: 98.6 F | WEIGHT: 185 LBS | SYSTOLIC BLOOD PRESSURE: 136 MMHG

## 2020-01-13 DIAGNOSIS — I10 HYPERTENSION GOAL BP (BLOOD PRESSURE) < 140/90: ICD-10-CM

## 2020-01-13 DIAGNOSIS — S46.211A RUPTURE OF RIGHT BICEPS TENDON, INITIAL ENCOUNTER: Primary | ICD-10-CM

## 2020-01-13 PROCEDURE — 99213 OFFICE O/P EST LOW 20 MIN: CPT | Performed by: NURSE PRACTITIONER

## 2020-01-13 RX ORDER — LISINOPRIL 20 MG/1
20 TABLET ORAL DAILY
Qty: 90 TABLET | Refills: 3 | Status: SHIPPED | OUTPATIENT
Start: 2020-01-13 | End: 2021-02-09

## 2020-01-13 ASSESSMENT — MIFFLIN-ST. JEOR: SCORE: 1543.96

## 2020-01-13 ASSESSMENT — PAIN SCALES - GENERAL: PAINLEVEL: MILD PAIN (2)

## 2020-01-13 NOTE — PATIENT INSTRUCTIONS
You have likely torn your biceps tendon     To treat this would advise compression with an ace wrap is fine, ice and rest. Would advise not using your arm as much as possible.     Apply ice through out the day.    Would start ibuprofen 2-3 times a day/ 600 mg     If things not improving may do an ultrasound - call and let me know if this is the case and will order this, in about 2 weeks

## 2020-01-13 NOTE — PROGRESS NOTES
"    SUBJECTIVE   Magdaleno Dutton is a  male who presents to clinic today for the following health issue(s):     Chief Complaint   Patient presents with     Musculoskeletal Problem     Hypertension     refill 1 time, will come in April for physical       Musculoskeletal problem/pain      Duration: 1/6/2020    Description  Location: right  bicep    Intensity:  Mild    Shoulder pain and bicep pain but mild now    Accompanying signs and symptoms: discoloration of upper arm    History  Previous similar problem: no   Previous evaluation:  None  Did see chiropractor whom thought neck was out    Precipitating or alleviating factors:  Trauma or overuse: YES- not sure what happened but was cutting wood the day is started hurting  Aggravating factors include: lifting    Therapies tried and outcome: ice        PCP   Physician No Ref-Primary None    Health Maintenance        Health Maintenance Due   Topic Date Due     COLONOSCOPY  [birth year]1961     ZOSTER IMMUNIZATION (1 of 2) 01/29/2001     PNEUMOCOCCAL IMMUNIZATION 65+ LOW/MEDIUM RISK (2 of 2 - PPSV23) 12/06/2018       HPI        Patient Active Problem List   Diagnosis     Hyperlipidemia LDL goal <160     Advanced directives, counseling/discussion     Hypertension goal BP (blood pressure) < 140/90     Current Outpatient Medications   Medication     Ascorbic Acid (VITAMIN C PO)     ASPIRIN PO     Cholecalciferol (VITAMIN D PO)     lisinopril (PRINIVIL/ZESTRIL) 20 MG tablet     mupirocin (BACTROBAN) 2 % external ointment     No current facility-administered medications for this visit.        Reviewed and updated as needed this visit by Provider:  Tobacco  Allergies  Meds  Med Hx  Surg Hx  Fam Hx  Soc Hx     ROS:  Constitutional, HEENT, cardiovascular, pulmonary, gi and gu systems are negative, except as otherwise noted.    PHYSICAL EXAM   /68   Pulse 72   Temp 98.6  F (37  C)   Resp 18   Ht 1.664 m (5' 5.5\")   Wt 83.9 kg (185 lb)   SpO2 96%   BMI 30.32 kg/m  " "  Body mass index is 30.32 kg/m .  GENERAL APPEARANCE: healthy, alert and no distress  RESP: lungs clear to auscultation - no rales, rhonchi or wheezes  CV: regular rates and rhythm, normal S1 S2, no S3 or S4 and no murmur, click or rub   MS: right upper extremity with normal range of motion, right bicep area with swelling and mild tenderness with fading ecchymosis without erythema, peripheral pulses normal. Positive Speeds and Yergasons test.      Diagnostic Test Results:  Future ultrasound     ASSESSMENT & PLAN   Risks, benefits, side effects and rationale for treatment plan fully discussed with the patient and understanding expressed.    Assessment & Plan     1. Rupture of right biceps tendon, initial encounter  Right arm pain and swelling after throwing wood into stove approximately 1 week ago. Is ecchymotic with mild discomfort. Given presentation and examination findings likely tendon rupture. Discussed conservative management of this and normal course of injury. Discussed could do an ultrasound to evaluate further if needed. Patient would like ultrasound ordered. Order placed. Patient advised to follow up as needed if not improving.   - US [facility]; Future    2. Hypertension goal BP (blood pressure) < 140/90  Chronic, stable. No changes.   - lisinopril (PRINIVIL/ZESTRIL) 20 MG tablet; Take 1 tablet (20 mg) by mouth daily  Dispense: 90 tablet; Refill: 3     BMI:   Estimated body mass index is 30.32 kg/m  as calculated from the following:    Height as of this encounter: 1.664 m (5' 5.5\").    Weight as of this encounter: 83.9 kg (185 lb).           Patient Instructions   You have likely torn your biceps tendon     To treat this would advise compression with an ace wrap is fine, ice and rest. Would advise not using your arm as much as possible.     Apply ice through out the day.    Would start ibuprofen 2-3 times a day/ 600 mg     If things not improving may do an ultrasound - call and let me know if this is " the case and will order this, in about 2 weeks        Return in about 2 weeks (around 1/27/2020), or if symptoms worsen or fail to improve.    MADDIE Fisher CNP  [facility]

## 2020-01-13 NOTE — TELEPHONE ENCOUNTER
Reason for Call:  Other     Detailed comments:  Pt was seen today and is wanting a US, referral/order. Stating he could go to the one in [place] for his arm.    Phone Number Patient can be reached at: Home number on file [phone] (home)    Best Time: any    Can we leave a detailed message on this number? YES    Call taken on 1/13/2020 at 3:50 PM by Ginger Bennett

## 2020-01-13 NOTE — NURSING NOTE
"Chief Complaint   Patient presents with     Musculoskeletal Problem     Hypertension     refill 1 time, will come in April for physical       Initial /68   Pulse 72   Temp 98.6  F (37  C)   Resp 18   Ht 1.664 m (5' 5.5\")   Wt 83.9 kg (185 lb)   SpO2 96%   BMI 30.32 kg/m   Estimated body mass index is 30.32 kg/m  as calculated from the following:    Height as of this encounter: 1.664 m (5' 5.5\").    Weight as of this encounter: 83.9 kg (185 lb).    Patient presents to the clinic using No DME    Health Maintenance that is potentially due pending provider review:  Colonoscopy/FIT    Pt declines to have colon cancer screen  will come in April for physical, will get pnuemovax at that time    Is there anyone who you would like to be able to receive your results? not asked  If yes have patient fill out LASHAUN    "

## 2020-01-14 NOTE — TELEPHONE ENCOUNTER
Ultrasound ordered, he will have to call to set up. Please provide number and any assistance to do this.    Thanks,  MADDIE Watson CNP

## 2020-01-15 ENCOUNTER — TELEPHONE (OUTPATIENT)
Dept: FAMILY MEDICINE | Facility: CLINIC | Age: 70
End: 2020-01-15

## 2020-01-15 NOTE — TELEPHONE ENCOUNTER
Pt's wife asked if he could have his US done at [place] and why he had to go to the [place] for this.    I called the  for diagnostics and she said this [facility]     Pt's wife notified.

## 2020-01-16 ENCOUNTER — ANCILLARY PROCEDURE (OUTPATIENT)
Dept: ULTRASOUND IMAGING | Facility: CLINIC | Age: 70
End: 2020-01-16
Attending: NURSE PRACTITIONER
Payer: COMMERCIAL

## 2020-01-16 DIAGNOSIS — S46.211A RUPTURE OF RIGHT BICEPS TENDON, INITIAL ENCOUNTER: ICD-10-CM

## 2020-01-28 ENCOUNTER — TELEPHONE (OUTPATIENT)
Dept: FAMILY MEDICINE | Facility: CLINIC | Age: 70
End: 2020-01-28

## 2020-01-28 DIAGNOSIS — S46.111A: Primary | ICD-10-CM

## 2020-01-28 NOTE — TELEPHONE ENCOUNTER
Spoke with patient, he continued to have shoulder pain with certain movements and when stretching arm. Pain now radiates into neck. Patient states the swelling has gone down.     Per Anju FARAH, patient needs ortho appointment.   IMPRESSION: Full-thickness or near full-thickness tearing of the long  head of the biceps tendon just distal to the bicipital groove with  associated hematoma.  Informed the patient that ortho  will call to schedule an appointment.

## 2020-01-30 ENCOUNTER — TELEPHONE (OUTPATIENT)
Dept: FAMILY MEDICINE | Facility: CLINIC | Age: 70
End: 2020-01-30

## 2020-01-30 NOTE — TELEPHONE ENCOUNTER
I called [facility] and got correct fax number to fax US report to. Faxed to Dr. Raphael in [place] at [phone] for his 2/3/20 9:30 am appointment.    [facility]

## 2020-01-30 NOTE — TELEPHONE ENCOUNTER
Art is calling to see if he can get a report of his ultrasound of his shoulder from 1-16 to take to his appt Monday at the  [facility] [place]

## 2020-09-16 ENCOUNTER — OFFICE VISIT (OUTPATIENT)
Dept: FAMILY MEDICINE | Facility: CLINIC | Age: 70
End: 2020-09-16
Payer: COMMERCIAL

## 2020-09-16 VITALS
RESPIRATION RATE: 12 BRPM | TEMPERATURE: 97.4 F | BODY MASS INDEX: 30.81 KG/M2 | WEIGHT: 188 LBS | DIASTOLIC BLOOD PRESSURE: 84 MMHG | HEART RATE: 68 BPM | OXYGEN SATURATION: 99 % | SYSTOLIC BLOOD PRESSURE: 138 MMHG

## 2020-09-16 DIAGNOSIS — M79.89 LEG SWELLING: ICD-10-CM

## 2020-09-16 DIAGNOSIS — M79.89 LEG SWELLING: Primary | ICD-10-CM

## 2020-09-16 DIAGNOSIS — L03.115 CELLULITIS OF RIGHT LOWER EXTREMITY: Primary | ICD-10-CM

## 2020-09-16 LAB
BASOPHILS # BLD AUTO: 0.1 10E9/L (ref 0–0.2)
BASOPHILS NFR BLD AUTO: 1.2 %
D DIMER PPP FEU-MCNC: 0.8 UG/ML FEU (ref 0–0.5)
DIFFERENTIAL METHOD BLD: NORMAL
EOSINOPHIL # BLD AUTO: 0.3 10E9/L (ref 0–0.7)
EOSINOPHIL NFR BLD AUTO: 5.2 %
ERYTHROCYTE [DISTWIDTH] IN BLOOD BY AUTOMATED COUNT: 13.1 % (ref 10–15)
HCT VFR BLD AUTO: 43.3 % (ref 40–53)
HGB BLD-MCNC: 14.4 G/DL (ref 13.3–17.7)
LYMPHOCYTES # BLD AUTO: 1.6 10E9/L (ref 0.8–5.3)
LYMPHOCYTES NFR BLD AUTO: 28.5 %
MCH RBC QN AUTO: 31 PG (ref 26.5–33)
MCHC RBC AUTO-ENTMCNC: 33.3 G/DL (ref 31.5–36.5)
MCV RBC AUTO: 93 FL (ref 78–100)
MONOCYTES # BLD AUTO: 0.7 10E9/L (ref 0–1.3)
MONOCYTES NFR BLD AUTO: 12.3 %
NEUTROPHILS # BLD AUTO: 3 10E9/L (ref 1.6–8.3)
NEUTROPHILS NFR BLD AUTO: 52.8 %
PLATELET # BLD AUTO: 190 10E9/L (ref 150–450)
RBC # BLD AUTO: 4.65 10E12/L (ref 4.4–5.9)
WBC # BLD AUTO: 5.8 10E9/L (ref 4–11)

## 2020-09-16 PROCEDURE — 36415 COLL VENOUS BLD VENIPUNCTURE: CPT | Performed by: FAMILY MEDICINE

## 2020-09-16 PROCEDURE — 85379 FIBRIN DEGRADATION QUANT: CPT | Performed by: FAMILY MEDICINE

## 2020-09-16 PROCEDURE — 85025 COMPLETE CBC W/AUTO DIFF WBC: CPT | Performed by: FAMILY MEDICINE

## 2020-09-16 PROCEDURE — 99214 OFFICE O/P EST MOD 30 MIN: CPT | Performed by: FAMILY MEDICINE

## 2020-09-16 RX ORDER — CEPHALEXIN 500 MG/1
500 CAPSULE ORAL 4 TIMES DAILY
Qty: 28 CAPSULE | Refills: 0 | Status: SHIPPED | OUTPATIENT
Start: 2020-09-16 | End: 2020-09-23

## 2020-09-16 NOTE — PROGRESS NOTES
SUBJECTIVE   Magdaleno Dutton is a 70 year old male who presents with     Musculoskeletal problem/pain  Onset/Duration: 6 days  Description  Location: leg - right  Joint Swelling: YES  Redness: YES- slight  Pain: YES- hurts when walking and worse by the end of the day  Warmth: YES  Intensity:  moderate  Progression of Symptoms:  worsening  Accompanying signs and symptoms:   Fevers: no  Numbness/tingling/weakness: no  History  Trauma to the area: YES- might have bumped on ski   Recent illness:  no  Previous similar problem: no  Previous evaluation:  YES - same in hand - had cellulitis  Precipitating or alleviating factors:  Aggravating factors include: walking and overuse  Therapies tried and outcome: rest/inactivity and Ibuprofen, ace bandage on Saturday - made it worse      PCP   Anju Antonio, MADDIE [facility] [phone]    Health Maintenance        Health Maintenance Due   Topic Date Due     COLORECTAL CANCER SCREENING  01/29/1960     ZOSTER IMMUNIZATION (1 of 2) 01/29/2001     PNEUMOCOCCAL IMMUNIZATION 65+ LOW/MEDIUM RISK (2 of 2 - PPSV23) 12/06/2018     MEDICARE ANNUAL WELLNESS VISIT  04/12/2020     FALL RISK ASSESSMENT  04/12/2020     INFLUENZA VACCINE (1) 09/01/2020       HPI        Patient Active Problem List   Diagnosis     Hyperlipidemia LDL goal <160     Advanced directives, counseling/discussion     Hypertension goal BP (blood pressure) < 140/90     Current Outpatient Medications   Medication     Ascorbic Acid (VITAMIN C PO)     ASPIRIN PO     Cholecalciferol (VITAMIN D PO)     lisinopril (PRINIVIL/ZESTRIL) 20 MG tablet     mupirocin (BACTROBAN) 2 % external ointment     No current facility-administered medications for this visit.        Patient Active Problem List   Diagnosis     Hyperlipidemia LDL goal <160     Advanced directives, counseling/discussion     Hypertension goal BP (blood pressure) < 140/90     No past surgical history on file.    Social History     Tobacco Use     Smoking status:  Former Smoker     Last attempt to quit: 2001     Years since quittin.7     Smokeless tobacco: Never Used   Substance Use Topics     Alcohol use: Yes     Alcohol/week: 28.0 standard drinks     Types: 28 Cans of beer per week     Frequency: 4 or more times a week     Drinks per session: 1 or 2     Binge frequency: Never     Comment: 3-5 beers at night      Family History   Problem Relation Age of Onset     Cancer Father      Diabetes Sister      Diabetes Sister          Current Outpatient Medications   Medication Sig Dispense Refill     Ascorbic Acid (VITAMIN C PO)        ASPIRIN PO Take 81 mg by mouth       Cholecalciferol (VITAMIN D PO)        lisinopril (PRINIVIL/ZESTRIL) 20 MG tablet Take 1 tablet (20 mg) by mouth daily 90 tablet 3     mupirocin (BACTROBAN) 2 % external ointment Apply topically 3 times daily (Patient not taking: Reported on 2020) 15 g 0     No Known Allergies  Recent Labs   Lab Test 10/24/18  1110 17  0918 16  0801   LDL  --  99 58   HDL  --  68 58   TRIG  --  133 331*   ALT 52 50 44   CR 0.67 0.78 0.70   GFRESTIMATED >90 >90 >90  Non African American GFR Calc     GFRESTBLACK >90 >90 >90  African American GFR Calc     POTASSIUM 4.0 4.3 4.3   TSH 1.37 2.32  --       BP Readings from Last 3 Encounters:   20 138/84   20 136/68   19 122/76    Wt Readings from Last 3 Encounters:   20 85.3 kg (188 lb)   20 83.9 kg (185 lb)   19 89.4 kg (197 lb)                    Reviewed and updated:  Tobacco  Allergies  Meds  Med Hx  Surg Hx  Fam Hx  Soc Hx     ROS:  Constitutional, neuro, ENT, endocrine, pulmonary, cardiac, gastrointestinal, genitourinary, musculoskeletal, integument and psychiatric systems are negative, except as otherwise noted.    PHYSICAL EXAM   /84   Pulse 68   Temp 97.4  F (36.3  C) (Tympanic)   Resp 12   Wt 85.3 kg (188 lb)   SpO2 99%   BMI 30.81 kg/m    Body mass index is 30.81 kg/m .  GENERAL: alert and no  distress  EYES: Eyes grossly normal to inspection, PERRL and conjunctivae and sclerae normal  NECK: no adenopathy, no asymmetry, masses, or scars and thyroid normal to palpation  RESP: lungs clear to auscultation - no rales, rhonchi or wheezes  CV: regular rate and rhythm, normal S1 S2, no S3 or S4  ABDOMEN: soft, nontender, no hepatosplenomegaly, no masses and bowel sounds normal  MS: right lower leg swollen, erythematous, warmth on palpation, Asencio and Homans sign is negative, pedal pulses 3+, sensation to touch impression intact, normal gait  NEURO: Normal strength and tone, mentation intact and speech normal            Assessment & Plan     Cellulitis of right lower extremity  --Suspect symptoms secondary to right lower extremity cellulitis, differentials discussed in detail including DVT(Wells score 0) and fracture (unlikely considering normal gait).  CBC and d-dimer ordered for further evaluation.  Cephalexin prescribed, common side effects discussed.  Suggested to keep leg elevated and continue Tylenol, icing.  Follow-up later this week or earlier if needed.  Patient understood and in agreement with above plan.  All question answered.  - CBC with platelets and differential  - cephALEXin (KEFLEX) 500 MG capsule; Take 1 capsule (500 mg) by mouth 4 times daily for 7 days    Leg swelling  - D dimer, quantitative         Nadir Martinez MD  [facility]

## 2020-09-16 NOTE — PROGRESS NOTES
D-dimer level came back slightly elevated.  Will do ultrasound to rule out DVT considering leg edema.  All questions answered.    Results for orders placed or performed in visit on 09/16/20   CBC with platelets and differential     Status: None   Result Value Ref Range    WBC 5.8 4.0 - 11.0 10e9/L    RBC Count 4.65 4.4 - 5.9 10e12/L    Hemoglobin 14.4 13.3 - 17.7 g/dL    Hematocrit 43.3 40.0 - 53.0 %    MCV 93 78 - 100 fl    MCH 31.0 26.5 - 33.0 pg    MCHC 33.3 31.5 - 36.5 g/dL    RDW 13.1 10.0 - 15.0 %    Platelet Count 190 150 - 450 10e9/L    % Neutrophils 52.8 %    % Lymphocytes 28.5 %    % Monocytes 12.3 %    % Eosinophils 5.2 %    % Basophils 1.2 %    Absolute Neutrophil 3.0 1.6 - 8.3 10e9/L    Absolute Lymphocytes 1.6 0.8 - 5.3 10e9/L    Absolute Monocytes 0.7 0.0 - 1.3 10e9/L    Absolute Eosinophils 0.3 0.0 - 0.7 10e9/L    Absolute Basophils 0.1 0.0 - 0.2 10e9/L    Diff Method Automated Method    D dimer, quantitative     Status: Abnormal   Result Value Ref Range    D Dimer 0.8 (H) 0.0 - 0.50 ug/ml FEU         Dr Martinez

## 2020-09-16 NOTE — PATIENT INSTRUCTIONS
Patient Education     Cellulitis  Cellulitis is an infection of the deep layers of skin. A break in the skin, such as a cut or scratch, can let bacteria under the skin. If the bacteria get to deep layers of the skin, it can be serious. If not treated, cellulitis can get into the bloodstream and lymph nodes. The infection can then spread throughout the body. This causes serious illness.  Cellulitis causes the affected skin to become red, swollen, warm, and sore. The reddened areas have a visible border. An open sore may leak fluid (pus). You may have a fever, chills, and pain.  Cellulitis is treated with antibiotics taken for 7 to 10 days. An open sore may be cleaned and covered with cool wet gauze. Symptoms should get better 1 to 2 days after treatment is started. Make sure to take all the antibiotics for the full number of days until they are gone. Keep taking the medicine even if your symptoms go away.  Home care  Follow these tips:    Limit the use of the part of your body with cellulitis.     If the infection is on your leg, keep your leg raised while sitting. This will help to reduce swelling.    Take all of the antibiotic medicine exactly as directed until it is gone. Do not miss any doses, especially during the first 7 days. Don t stop taking the medicine when your symptoms get better.    Keep the affected area clean and dry.    Wash your hands with soap and warm water before and after touching your skin. Anyone else who touches your skin should also wash his or her hands. Don't share towels.  Follow-up care  Follow up with your healthcare provider, or as advised. If your infection does not go away on the first antibiotic, your healthcare provider will prescribe a different one.  When to seek medical advice  Call your healthcare provider right away if any of these occur:    Red areas that spread    Swelling or pain that gets worse    Fluid leaking from the skin (pus)    Fever higher of 100.4  F (38.0  C) or  higher after 2 days on antibiotics  Date Last Reviewed: 9/1/2016 2000-2019 [employer]. [address] [place] [address], [place] [address]. All rights reserved. This information is not intended as a substitute for professional medical care. Always follow your healthcare professional's instructions.

## 2020-09-18 ENCOUNTER — HOSPITAL ENCOUNTER (OUTPATIENT)
Dept: ULTRASOUND IMAGING | Facility: CLINIC | Age: 70
Discharge: HOME | End: 2020-09-18
Attending: FAMILY MEDICINE | Admitting: FAMILY MEDICINE
Payer: COMMERCIAL

## 2020-09-18 DIAGNOSIS — M79.89 LEG SWELLING: ICD-10-CM

## 2020-09-18 PROCEDURE — 93971 EXTREMITY STUDY: CPT | Mod: RT

## 2020-09-21 ENCOUNTER — TELEPHONE (OUTPATIENT)
Dept: FAMILY MEDICINE | Facility: CLINIC | Age: 70
End: 2020-09-21

## 2020-09-21 NOTE — TELEPHONE ENCOUNTER
Dr. Martinez,  Please see note below, called and spoke to the patient and his wife, they have appointment today in [place] at 11:20, is it ok to get the flu shot? Patient has one more day of antibiotic, no temp and the leg is better, please advise. Thank you.    HUBER Grey

## 2020-09-21 NOTE — TELEPHONE ENCOUNTER
Reason for Call:  Other     Detailed comments: Patient was seen last week and Dr. oneal said he could not have his flue-pneumonia shots at this time can they do it today?  Please call patient    Phone Number Patient can be reached at: [phone]    Best Time:     Can we leave a detailed message on this number? YES    Call taken on 9/21/2020 at 8:30 AM by Viktoria Milligan

## 2020-09-21 NOTE — TELEPHONE ENCOUNTER
Patient advised, he will reschedule and will schedule a follow up on the cellulitis this week as well.    HUBER Grey

## 2020-09-23 ENCOUNTER — OFFICE VISIT (OUTPATIENT)
Dept: FAMILY MEDICINE | Facility: CLINIC | Age: 70
End: 2020-09-23
Payer: COMMERCIAL

## 2020-09-23 VITALS
HEART RATE: 76 BPM | SYSTOLIC BLOOD PRESSURE: 126 MMHG | DIASTOLIC BLOOD PRESSURE: 74 MMHG | RESPIRATION RATE: 12 BRPM | TEMPERATURE: 98.5 F | BODY MASS INDEX: 30.97 KG/M2 | WEIGHT: 189 LBS

## 2020-09-23 DIAGNOSIS — L03.115 CELLULITIS OF RIGHT LOWER EXTREMITY: Primary | ICD-10-CM

## 2020-09-23 PROCEDURE — 99213 OFFICE O/P EST LOW 20 MIN: CPT | Performed by: FAMILY MEDICINE

## 2020-09-23 RX ORDER — CEPHALEXIN 500 MG/1
500 CAPSULE ORAL 4 TIMES DAILY
Qty: 28 CAPSULE | Refills: 0 | Status: SHIPPED | OUTPATIENT
Start: 2020-09-23 | End: 2020-09-26

## 2020-09-23 NOTE — NURSING NOTE
"Chief Complaint   Patient presents with     Musculoskeletal Problem     right leg cellulitis     /74   Pulse 76   Temp 98.5  F (36.9  C) (Tympanic)   Resp 12   Wt 85.7 kg (189 lb)   BMI 30.97 kg/m   Estimated body mass index is 30.97 kg/m  as calculated from the following:    Height as of 1/13/20: 1.664 m (5' 5.5\").    Weight as of this encounter: 85.7 kg (189 lb).  Patient presents to the clinic using No DME      Health Maintenance that is potentially due pending provider review:    Health Maintenance Due   Topic Date Due     COLORECTAL CANCER SCREENING  01/29/1960     ZOSTER IMMUNIZATION (1 of 2) 01/29/2001     PNEUMOCOCCAL IMMUNIZATION 65+ LOW/MEDIUM RISK (2 of 2 - PPSV23) 12/06/2018     MEDICARE ANNUAL WELLNESS VISIT  04/12/2020     FALL RISK ASSESSMENT  04/12/2020     INFLUENZA VACCINE (1) 09/01/2020        Possibly completing today per provider review.        "

## 2020-09-23 NOTE — PROGRESS NOTES
SUBJECTIVE   Magdaleno Dutton is a 70 year old male who presents with     Chief Complaint   Patient presents with     Musculoskeletal Problem     right leg cellulitis     Imm/Inj     Flu Shot     Recheck right leg cellulitis  Had swelling last night - was on feet all day yesterday  Otherwise no swelling  Redness is much better      PCP   Anju Antonio, MADDIE [facility] [phone]    Health Maintenance        Health Maintenance Due   Topic Date Due     COLORECTAL CANCER SCREENING  1960     ZOSTER IMMUNIZATION (1 of 2) 2001     PNEUMOCOCCAL IMMUNIZATION 65+ LOW/MEDIUM RISK (2 of 2 - PPSV23) 2018     MEDICARE ANNUAL WELLNESS VISIT  2020     FALL RISK ASSESSMENT  2020     INFLUENZA VACCINE (1) 2020       HPI        Patient Active Problem List   Diagnosis     Hyperlipidemia LDL goal <160     Advanced directives, counseling/discussion     Hypertension goal BP (blood pressure) < 140/90     Current Outpatient Medications   Medication     Ascorbic Acid (VITAMIN C PO)     ASPIRIN PO     Cholecalciferol (VITAMIN D PO)     lisinopril (PRINIVIL/ZESTRIL) 20 MG tablet     cephALEXin (KEFLEX) 500 MG capsule     mupirocin (BACTROBAN) 2 % external ointment     No current facility-administered medications for this visit.        Patient Active Problem List   Diagnosis     Hyperlipidemia LDL goal <160     Advanced directives, counseling/discussion     Hypertension goal BP (blood pressure) < 140/90     No past surgical history on file.    Social History     Tobacco Use     Smoking status: Former Smoker     Last attempt to quit: 2001     Years since quittin.7     Smokeless tobacco: Never Used   Substance Use Topics     Alcohol use: Yes     Alcohol/week: 28.0 standard drinks     Types: 28 Cans of beer per week     Frequency: 4 or more times a week     Drinks per session: 1 or 2     Binge frequency: Never     Comment: 3-5 beers at night      Family History   Problem Relation Age of Onset     Cancer  Father      Diabetes Sister      Diabetes Sister          Current Outpatient Medications   Medication Sig Dispense Refill     Ascorbic Acid (VITAMIN C PO)        ASPIRIN PO Take 81 mg by mouth       Cholecalciferol (VITAMIN D PO)        lisinopril (PRINIVIL/ZESTRIL) 20 MG tablet Take 1 tablet (20 mg) by mouth daily 90 tablet 3     cephALEXin (KEFLEX) 500 MG capsule Take 1 capsule (500 mg) by mouth 4 times daily for 7 days (Patient not taking: Reported on 9/23/2020) 28 capsule 0     mupirocin (BACTROBAN) 2 % external ointment Apply topically 3 times daily (Patient not taking: Reported on 1/13/2020) 15 g 0     No Known Allergies  Recent Labs   Lab Test 10/24/18  1110 12/28/17  0918 12/21/16  0801   LDL  --  99 58   HDL  --  68 58   TRIG  --  133 331*   ALT 52 50 44   CR 0.67 0.78 0.70   GFRESTIMATED >90 >90 >90  Non African American GFR Calc             GFRESTBLACK >90 >90 >90  African American GFR Calc     POTASSIUM 4.0 4.3 4.3   TSH 1.37 2.32  --       BP Readings from Last 3 Encounters:   09/23/20 126/74   09/16/20 138/84   01/13/20 136/68    Wt Readings from Last 3 Encounters:   09/23/20 85.7 kg (189 lb)   09/16/20 85.3 kg (188 lb)   01/13/20 83.9 kg (185 lb)                    Reviewed and updated:  Tobacco  Allergies  Meds  Med Hx  Surg Hx  Fam Hx  Soc Hx     ROS:  Constitutional, HEENT, cardiovascular, pulmonary, gi and gu systems are negative, except as otherwise noted.    PHYSICAL EXAM   /74   Pulse 76   Temp 98.5  F (36.9  C) (Tympanic)   Resp 12   Wt 85.7 kg (189 lb)   BMI 30.97 kg/m    Body mass index is 30.97 kg/m .  GENERAL: alert and no distress  NECK: no adenopathy, no asymmetry, masses, or scars and thyroid normal to palpation  RESP: lungs clear to auscultation - no rales, rhonchi or wheezes  CV: regular rate and rhythm, normal S1 S2, no S3 or S4, no murmur, click or rub, no peripheral edema and peripheral pulses strong  ABDOMEN: soft, nontender, no hepatosplenomegaly, no masses and  bowel sounds normal  MS: no gross musculoskeletal defects noted, no edema  SKIN: right lower leg slight erythematous, no tenderness, warmth or swelling noted              Assessment & Plan     Cellulitis of right lower extremity  --Right lower leg cellulitis improving satisfactorily, physical examination remarkable for slight right lower erythema.  Suggested to complete 10-day course of antibiotic.  Continue leg elevation when possible.  All questions answered.  - cephALEXin (KEFLEX) 500 MG capsule; Take 1 capsule (500 mg) by mouth 4 times daily for 3 days         Nadir Martinez MD  [facility]

## 2020-10-02 ENCOUNTER — IMMUNIZATION (OUTPATIENT)
Dept: FAMILY MEDICINE | Facility: CLINIC | Age: 70
End: 2020-10-02
Payer: COMMERCIAL

## 2020-10-02 PROCEDURE — 90662 IIV NO PRSV INCREASED AG IM: CPT

## 2020-10-02 PROCEDURE — G0008 ADMIN INFLUENZA VIRUS VAC: HCPCS

## 2020-10-20 ENCOUNTER — VIRTUAL VISIT (OUTPATIENT)
Dept: FAMILY MEDICINE | Facility: CLINIC | Age: 70
End: 2020-10-20
Payer: COMMERCIAL

## 2020-10-20 DIAGNOSIS — Z20.822 SUSPECTED COVID-19 VIRUS INFECTION: Primary | ICD-10-CM

## 2020-10-20 PROCEDURE — 99214 OFFICE O/P EST MOD 30 MIN: CPT | Mod: 95 | Performed by: FAMILY MEDICINE

## 2020-10-20 NOTE — PROGRESS NOTES
"Magdaleno Dutton is a 70 year old male who is being evaluated via a billable video visit.      The patient has been notified of following:     \"This video visit will be conducted via a call between you and your physician/provider. We have found that certain health care needs can be provided without the need for an in-person physical exam.  This service lets us provide the care you need with a video conversation.  If a prescription is necessary we can send it directly to your pharmacy.  If lab work is needed we can place an order for that and you can then stop by our lab to have the test done at a later time.    Video visits are billed at different rates depending on your insurance coverage.  Please reach out to your insurance provider with any questions.    If during the course of the call the physician/provider feels a video visit is not appropriate, you will not be charged for this service.\"    Patient has given verbal consent for Video visit? Yes  How would you like to obtain your AVS? Mail a copy  If you are dropped from the video visit, the video invite should be resent to: Text to cell phone: [phone] [employer]  Will anyone else be joining your video visit? No    Subjective     Magdaleno Dutton is a 70 year old male who presents today via video visit for the following health issues:    HPI     Acute Illness  Acute illness concerns: 1 week   Onset/Duration: fever and headaches   Symptoms:  Fever: YES hightest 103  Chills/Sweats: YES  Headache (location?): YES  Sinus Pressure: YES  Conjunctivitis:  no  Ear Pain: no  Rhinorrhea: YES  Congestion: YES  Sore Throat: no  Cough: no  Wheeze: no  Decreased Appetite: no  Nausea: no  Vomiting: no  Diarrhea: no  Dysuria/Freq.: no  Dysuria or Hematuria: no  Fatigue/Achiness: YES  Sick/Strep Exposure: no  Therapies tried and outcome: tylenol helps           Video Start Time: 1:49 PM        Review of Systems   Constitutional, HEENT, cardiovascular, pulmonary, gi and gu " "systems are negative, except as otherwise noted.      Objective           Vitals:  No vitals were obtained today due to virtual visit.    Physical Exam     GENERAL: Healthy, alert and no distress  EYES: Eyes grossly normal to inspection.  No discharge or erythema, or obvious scleral/conjunctival abnormalities.  RESP: No audible wheeze, cough, or visible cyanosis.  No visible retractions or increased work of breathing.    SKIN: Visible skin clear. No significant rash, abnormal pigmentation or lesions.  NEURO: Cranial nerves grossly intact.  Mentation and speech appropriate for age.  PSYCH: Mentation appears normal, affect normal/bright, judgement and insight intact, normal speech and appearance well-groomed.              Assessment & Plan     Suspected COVID-19 virus infection  However sxs are getting better   - Symptomatic COVID-19 Virus (Coronavirus) by PCR; Future     BMI:   Estimated body mass index is 30.97 kg/m  as calculated from the following:    Height as of 1/13/20: 1.664 m (5' 5.5\").    Weight as of 9/23/20: 85.7 kg (189 lb).          Instructed to wait for call to set up test   quarantine until that time     Return in about 1 week (around 10/27/2020), or if symptoms worsen or fail to improve.    Aure Maldonado MD  [facility]      Video-Visit Details    Type of service:  Video Visit    Video End Time:205pm    Originating Location (pt. Location): Home    Distant Location (provider location):  [facility]     Platform used for Video Visit: Zahira        "

## 2020-12-14 ENCOUNTER — OFFICE VISIT (OUTPATIENT)
Dept: FAMILY MEDICINE | Facility: CLINIC | Age: 70
End: 2020-12-14
Payer: COMMERCIAL

## 2020-12-14 VITALS
SYSTOLIC BLOOD PRESSURE: 110 MMHG | WEIGHT: 187 LBS | RESPIRATION RATE: 12 BRPM | TEMPERATURE: 97.8 F | HEART RATE: 76 BPM | DIASTOLIC BLOOD PRESSURE: 80 MMHG | BODY MASS INDEX: 30.65 KG/M2

## 2020-12-14 DIAGNOSIS — Z23 NEED FOR VACCINATION: ICD-10-CM

## 2020-12-14 DIAGNOSIS — Z48.02 ENCOUNTER FOR REMOVAL OF SUTURES: Primary | ICD-10-CM

## 2020-12-14 PROCEDURE — 99212 OFFICE O/P EST SF 10 MIN: CPT | Mod: 25 | Performed by: NURSE PRACTITIONER

## 2020-12-14 PROCEDURE — 90732 PPSV23 VACC 2 YRS+ SUBQ/IM: CPT | Performed by: NURSE PRACTITIONER

## 2020-12-14 PROCEDURE — G0009 ADMIN PNEUMOCOCCAL VACCINE: HCPCS | Performed by: NURSE PRACTITIONER

## 2020-12-14 NOTE — PROGRESS NOTES
SUBJECTIVE   Magdaleno Dutton is a  male who presents to clinic today for the following health issue(s):       ED/UC Followup:    Facility:  [facility]  Date of visit: 12/4/20  Reason for visit: Facial laceration  Current Status: jaw is still pretty sore, needs sutures out     Swelling has improved  Difficulty chewing some things     PCP   MADDIE Fisher [phone]    Health Maintenance        Health Maintenance Due   Topic Date Due     COLORECTAL CANCER SCREENING  01/29/1960     ZOSTER IMMUNIZATION (1 of 2) 01/29/2001     MEDICARE ANNUAL WELLNESS VISIT  04/12/2020       HPI        Patient Active Problem List   Diagnosis     Hyperlipidemia LDL goal <160     Advanced directives, counseling/discussion     Hypertension goal BP (blood pressure) < 140/90     Current Outpatient Medications   Medication     Ascorbic Acid (VITAMIN C PO)     ASPIRIN PO     Cholecalciferol (VITAMIN D PO)     lisinopril (PRINIVIL/ZESTRIL) 20 MG tablet     No current facility-administered medications for this visit.        Reviewed and updated as needed this visit by Provider:  Tobacco  Allergies  Meds  Med Hx  Surg Hx  Fam Hx  Soc Hx     ROS:  Constitutional, HEENT, cardiovascular, pulmonary, gi and gu systems are negative, except as otherwise noted.    PHYSICAL EXAM   /80   Pulse 76   Temp 97.8  F (36.6  C) (Tympanic)   Resp 12   Wt 84.8 kg (187 lb)   BMI 30.65 kg/m    Body mass index is 30.65 kg/m .  GENERAL APPEARANCE: healthy, alert and no distress  SKIN: no suspicious lesions or rashes and well approximated, healed laceration on right lower cheek with 4 stitches in place.  Sutures removed without difficulty.      Diagnostic Test Results:  none     ASSESSMENT & PLAN   Assessment & Plan     1. Encounter for removal of sutures  4 sutures on right lower cheek removed without incident. Apply heat to cheek area through out the day. Return to clinic if area becomes red, more swollen or drainage is  "present. Return to clinic otherwise as needed.        BMI:   Estimated body mass index is 30.65 kg/m  as calculated from the following:    Height as of 1/13/20: 1.664 m (5' 5.5\").    Weight as of this encounter: 84.8 kg (187 lb).         Risks, benefits, side effects and rationale for treatment plan fully discussed with the patient and understanding expressed.    See Patient Instructions    Return in about 2 weeks (around 12/28/2020), or if symptoms worsen or fail to improve.    MADDIE Fisher [facility]    Risks, benefits, side effects and rationale for treatment plan fully discussed with the patient and understanding expressed.                  "

## 2020-12-14 NOTE — NURSING NOTE
"Chief Complaint   Patient presents with     ER F/U     Laceration     /80   Pulse 76   Temp 97.8  F (36.6  C) (Tympanic)   Resp 12   Wt 84.8 kg (187 lb)   BMI 30.65 kg/m   Estimated body mass index is 30.65 kg/m  as calculated from the following:    Height as of 1/13/20: 1.664 m (5' 5.5\").    Weight as of this encounter: 84.8 kg (187 lb).  Patient presents to the clinic using No DME      Health Maintenance that is potentially due pending provider review:    Health Maintenance Due   Topic Date Due     COLORECTAL CANCER SCREENING  01/29/1960     ZOSTER IMMUNIZATION (1 of 2) 01/29/2001     Pneumococcal Vaccine: 65+ Years (2 of 2 - PPSV23) 12/06/2018     MEDICARE ANNUAL WELLNESS VISIT  04/12/2020        n/a        "

## 2020-12-14 NOTE — PATIENT INSTRUCTIONS
1. Encounter for removal of sutures  4 sutures on right lower cheek removed without incident. Apply heat to cheek area through out the day. Return to clinic if area becomes red, more swollen or drainage is present. Return to clinic otherwise as needed.    Name band;

## 2021-02-12 ENCOUNTER — OFFICE VISIT (OUTPATIENT)
Dept: FAMILY MEDICINE | Facility: CLINIC | Age: 71
End: 2021-02-12
Payer: COMMERCIAL

## 2021-02-12 VITALS
OXYGEN SATURATION: 97 % | SYSTOLIC BLOOD PRESSURE: 136 MMHG | BODY MASS INDEX: 31.92 KG/M2 | RESPIRATION RATE: 18 BRPM | DIASTOLIC BLOOD PRESSURE: 70 MMHG | HEIGHT: 65 IN | TEMPERATURE: 97.6 F | WEIGHT: 191.6 LBS | HEART RATE: 74 BPM

## 2021-02-12 DIAGNOSIS — Z12.11 ENCOUNTER FOR SCREENING FOR MALIGNANT NEOPLASM OF COLON: ICD-10-CM

## 2021-02-12 DIAGNOSIS — I10 HYPERTENSION GOAL BP (BLOOD PRESSURE) < 140/90: Primary | ICD-10-CM

## 2021-02-12 LAB
ANION GAP SERPL CALCULATED.3IONS-SCNC: 3 MMOL/L (ref 3–14)
BUN SERPL-MCNC: 21 MG/DL (ref 7–30)
CALCIUM SERPL-MCNC: 9.1 MG/DL (ref 8.5–10.1)
CHLORIDE SERPL-SCNC: 109 MMOL/L (ref 94–109)
CO2 SERPL-SCNC: 27 MMOL/L (ref 20–32)
CREAT SERPL-MCNC: 0.94 MG/DL (ref 0.66–1.25)
GFR SERPL CREATININE-BSD FRML MDRD: 81 ML/MIN/{1.73_M2}
GLUCOSE SERPL-MCNC: 98 MG/DL (ref 70–99)
POTASSIUM SERPL-SCNC: 4.1 MMOL/L (ref 3.4–5.3)
SODIUM SERPL-SCNC: 139 MMOL/L (ref 133–144)

## 2021-02-12 PROCEDURE — 80048 BASIC METABOLIC PNL TOTAL CA: CPT | Performed by: NURSE PRACTITIONER

## 2021-02-12 PROCEDURE — 36415 COLL VENOUS BLD VENIPUNCTURE: CPT | Performed by: NURSE PRACTITIONER

## 2021-02-12 PROCEDURE — 99213 OFFICE O/P EST LOW 20 MIN: CPT | Performed by: NURSE PRACTITIONER

## 2021-02-12 RX ORDER — LISINOPRIL 20 MG/1
20 TABLET ORAL DAILY
Qty: 90 TABLET | Refills: 3 | Status: SHIPPED | OUTPATIENT
Start: 2021-02-12 | End: 2022-03-07

## 2021-02-12 ASSESSMENT — MIFFLIN-ST. JEOR: SCORE: 1555.97

## 2021-02-12 NOTE — PATIENT INSTRUCTIONS
Hypertension goal BP (blood pressure) < 140/90  Chronic, stable. No changes. Check labs today - will call with results.   - lisinopril (ZESTRIL) 20 MG tablet; Take 1 tablet (20 mg) by mouth daily  - Basic metabolic panel  (Ca, Cl, CO2, Creat, Gluc, K, Na, BUN)    Encounter for screening for malignant neoplasm of colon  Screen - return to clinic when done  - Fecal colorectal cancer screen (FIT); Future    For COVID vaccine - go online or call [employer] or [employer] and sign up for 65 and older vaccine, otherwise [place] is only 75 and older.

## 2021-02-12 NOTE — LETTER
February 12, 2021      Magdaleno Ortizter  [id number]   [place] [address]        Dear ,    We are writing to inform you of your test results.    metabolic panel including electrolytes, glucose level and kidney function are normal.     Resulted Orders   Basic metabolic panel  (Ca, Cl, CO2, Creat, Gluc, K, Na, BUN)   Result Value Ref Range    Sodium 139 133 - 144 mmol/L    Potassium 4.1 3.4 - 5.3 mmol/L    Chloride 109 94 - 109 mmol/L    Carbon Dioxide 27 20 - 32 mmol/L    Anion Gap 3 3 - 14 mmol/L    Glucose 98 70 - 99 mg/dL      Comment:      Non Fasting    Urea Nitrogen 21 7 - 30 mg/dL    Creatinine 0.94 0.66 - 1.25 mg/dL    GFR Estimate 81 >60 mL/min/[1.73_m2]      Comment:      Non  GFR Calc  Starting 12/18/2018, serum creatinine based estimated GFR (eGFR) will be   calculated using the Chronic Kidney Disease Epidemiology Collaboration   (CKD-EPI) equation.      GFR Estimate If Black >90 >60 mL/min/[1.73_m2]      Comment:       GFR Calc  Starting 12/18/2018, serum creatinine based estimated GFR (eGFR) will be   calculated using the Chronic Kidney Disease Epidemiology Collaboration   (CKD-EPI) equation.      Calcium 9.1 8.5 - 10.1 mg/dL       If you have any questions or concerns, please call the clinic at the number listed above.       Sincerely,      MADDIE Lay CNP/dw

## 2021-02-12 NOTE — PROGRESS NOTES
"    Assessment & Plan     Hypertension goal BP (blood pressure) < 140/90  Chronic, stable. No changes. Check labs today - will call with results. Return to clinic in 6 months for recheck.  - lisinopril (ZESTRIL) 20 MG tablet; Take 1 tablet (20 mg) by mouth daily  - Basic metabolic panel  (Ca, Cl, CO2, Creat, Gluc, K, Na, BUN)    Encounter for screening for malignant neoplasm of colon  Screen - return to clinic when done  - Fecal colorectal cancer screen (FIT); Future             BMI:   Estimated body mass index is 31.88 kg/m  as calculated from the following:    Height as of this encounter: 1.651 m (5' 5\").    Weight as of this encounter: 86.9 kg (191 lb 9.6 oz).       See Patient Instructions    No follow-ups on file.    Anju Antonio, MADDIE CNP  [facility]    Flora Dolan is a 71 year old who presents for the following health issues     HPI       Hyperlipidemia Follow-Up      Are you regularly taking any medication or supplement to lower your cholesterol?   No    Are you having muscle aches or other side effects that you think could be caused by your cholesterol lowering medication?  No N/A    Lipids in 2017 normal     Hypertension Follow-up      Do you check your blood pressure regularly outside of the clinic? No     Are you following a low salt diet? No    Are your blood pressures ever more than 140 on the top number (systolic) OR more   than 90 on the bottom number (diastolic), for example 140/90? No N/A    How many servings of fruits and vegetables do you eat daily?  0-1    On average, how many sweetened beverages do you drink each day (Examples: soda, juice, sweet tea, etc.  Do NOT count diet or artificially sweetened beverages)?   1    How many days per week do you exercise enough to make your heart beat faster? 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 30 - 60    How many days per week " "do you miss taking your medication? 0    Ate rose and toast today  Declined colon cancer screening  No swelling       Review of Systems   Constitutional, HEENT, cardiovascular, pulmonary, gi and gu systems are negative, except as otherwise noted.      Objective    /70   Pulse 74   Temp 97.6  F (36.4  C)   Resp 18   Ht 1.651 m (5' 5\")   Wt 86.9 kg (191 lb 9.6 oz)   SpO2 97%   BMI 31.88 kg/m    Body mass index is 31.88 kg/m .  Physical Exam   GENERAL APPEARANCE: healthy, alert and no distress  NECK: no adenopathy, no asymmetry, masses, or scars, thyroid normal to palpation and no bruits  RESP: lungs clear to auscultation - no rales, rhonchi or wheezes  CV: regular rates and rhythm, normal S1 S2, no S3 or S4 and no murmur, click or rub  ABDOMEN: soft, nontender, without hepatosplenomegaly or masses and bowel sounds normal  MS: extremities normal- no gross deformities noted and peripheral pulses normal  SKIN: no suspicious lesions or rashes  NEURO: Normal strength and tone, mentation intact and speech normal  PSYCH: mentation appears normal and affect normal/bright    Diagnostic Test Results:  Pending              "

## 2021-06-08 ENCOUNTER — VIRTUAL VISIT (OUTPATIENT)
Dept: FAMILY MEDICINE | Facility: CLINIC | Age: 71
End: 2021-06-08
Payer: COMMERCIAL

## 2021-06-08 DIAGNOSIS — R21 RASH AND NONSPECIFIC SKIN ERUPTION: Primary | ICD-10-CM

## 2021-06-08 PROCEDURE — 99442 PR PHYSICIAN TELEPHONE EVALUATION 11-20 MIN: CPT | Mod: 95 | Performed by: NURSE PRACTITIONER

## 2021-06-08 NOTE — PATIENT INSTRUCTIONS
Rash and nonspecific skin eruption  Acute rash, developed yesterday.  Patient was out cutting long grass.  No other recent or new exposures.  Patient describes as tiny, raised, hard erythematous papules.  Itching very mild to none.  No fevers.  Benadryl cream did help.  Likely allergic reaction, recommend over-the-counter antihistamine daily such as Claritin or Zyrtec and topical Benadryl or cortisone cream twice daily.  Advised patient to follow-up in clinic if symptoms not improving or worsening.

## 2021-06-08 NOTE — PROGRESS NOTES
"Magdaleno is a 71 year old who is being evaluated via a billable telephone visit.      What phone number would you like to be contacted at? .  How would you like to obtain your AVS? Mail a copy      Start Time: 2:46 PM - 2:58 PM     Assessment & Plan     Rash and nonspecific skin eruption  Acute rash, developed yesterday.  Patient was out cutting long grass.  No other recent or new exposures.  Patient describes as tiny, raised, hard erythematous papules.  Itching very mild to none.  No fevers.  Benadryl cream did help.  Likely allergic reaction, recommend over-the-counter antihistamine daily such as Claritin or Zyrtec and topical Benadryl or cortisone cream twice daily.  Advised patient to follow-up in clinic if symptoms not improving or worsening.               BMI:   Estimated body mass index is 31.88 kg/m  as calculated from the following:    Height as of 2/12/21: 1.651 m (5' 5\").    Weight as of 2/12/21: 86.9 kg (191 lb 9.6 oz).       See Patient Instructions    Return in about 1 week (around 6/15/2021), or if symptoms worsen or fail to improve.    MADDIE Fisher CNP  [facility]    Subjective   Magdaleno is a 71 year old who presents for the following health issues     HPI     Start Time: 2:46 PM - 2:58 PM   Rash  Onset/Duration: since yesterday  Description  Location: both arms and one on forehead  Character: round red about pencil eraser size - hard   Itching: not so much - more irritating   Intensity:  mild  Progression of Symptoms:  worsening  Accompanying signs and symptoms:   Fever: no  Body aches or joint pain: no  Sore throat symptoms: no  Recent cold symptoms: no  History:           Previous episodes of similar rash: None  New exposures:  grasses  Recent travel: no  Exposure to similar rash: no  Precipitating or alleviating factors: 0  Therapies tried and outcome: benadryl cream on one bump and it helped    Mowed some long grass yesterday   Doesn't look like poison ivy "       Review of Systems   Constitutional, HEENT, cardiovascular, pulmonary, gi and gu systems are negative, except as otherwise noted.      Objective           Vitals:  No vitals were obtained today due to virtual visit.    Physical Exam   healthy, alert and no distress  PSYCH: Alert and oriented times 3; coherent speech, normal   rate and volume, able to articulate logical thoughts, able   to abstract reason, no tangential thoughts, no hallucinations   or delusions  His affect is normal  RESP: No cough, no audible wheezing, able to talk in full sentences  Remainder of exam unable to be completed due to telephone visits    Diagnostic Test Results:  none            Phone call duration: 12 minutes.

## 2021-10-05 ENCOUNTER — THERAPY VISIT (OUTPATIENT)
Dept: PHYSICAL THERAPY | Facility: CLINIC | Age: 71
End: 2021-10-05
Payer: COMMERCIAL

## 2021-10-05 DIAGNOSIS — M25.511 CHRONIC RIGHT SHOULDER PAIN: ICD-10-CM

## 2021-10-05 DIAGNOSIS — G89.29 CHRONIC RIGHT SHOULDER PAIN: ICD-10-CM

## 2021-10-05 PROCEDURE — 97110 THERAPEUTIC EXERCISES: CPT | Mod: GP | Performed by: PHYSICAL THERAPIST

## 2021-10-05 PROCEDURE — 97010 HOT OR COLD PACKS THERAPY: CPT | Mod: GP | Performed by: PHYSICAL THERAPIST

## 2021-10-05 PROCEDURE — 97161 PT EVAL LOW COMPLEX 20 MIN: CPT | Mod: GP | Performed by: PHYSICAL THERAPIST

## 2021-10-05 NOTE — LETTER
[facility]  [address]  [place] [address]  [phone]    2021    Re: Magdaleno Dutton   :   1950  MRN:  [id number]   REFERRING PHYSICIAN:   Katerine VANCE [facility]    Date of Initial Evaluation:  10/5/21  Visits:  Rxs Used: 1  Reason for Referral:  Chronic right shoulder pain    Physical Therapy Initial Evaluation    Subjective:  aMgdaleno Dutton is a 71 year old male with a right shoulder condition.    The condition occurred due to forced shoulder abduction.  The condition occurred at home.  This is a new on chronic condition.  Mechanism/History of injury/symptoms:  10/1/21 patient received MD orders for PT for right shoulder pain due to a rotator cuff tear and biceps tendon tear.  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  The pain is located biceps, anterior and the quality of pain is reported as aching or sharp.  The degree of pain is reported as 2/10. The timing of pain/symptoms is intermittent, not dependent on time of day. Associated symptoms include: weakness, loss of motion  Symptoms are exacerbated by: reaching away from body, lifting away from body or overhead.  Symptoms are relieved by: rest, ibuprofen.  Since onset symptoms are gradually improving.  Special tests for this condition include: x-ray, MRI.  Previous treatments for this condition include: none.  General health as reported by patient is good.  Pertinent medical history includes: high blood pressure.  Medical allergies include: none.  Other surgeries include: none.  Current medications:  High blood pressure, muscle relaxants  Current occupation: retired, farming.  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  Patient  is currently doing normal tasks on farm but with some modifications to avoid pain.  Potential barriers to physical therapy: none.  Red flags: none.  Previous level of function: able to reach/lift away from body and overhead without shoulder pain  Current functional restrictions:  Unable to reach/lift away from body without shoulder pain, limited strength lifting away from body or overhead, pain sleeping on right side or prolonged use away from body    HPI    Objective:  SHOULDER:   PROM L PROM R AROM L AROM R MMT L MMT R   Flex  160  160 160 with painful arc 5/5 4-/5   Abd  160  160 160 with painful arc 5/5 4+/5   Full Can     5/5 4-/5   ER  65 65 45 5/5 4+/5   Ext/IR   L2 L2 5/5 5/5     Scapulothoraic Rhythm: RIGHT scapular dyskinesis noted with active elevation    Palpation: Mild tenderness about right biceps and posterior shoulder    Special tests:   L R   Impingement     Neer's - -   Hawkin's-Tu - +   Coracoid Impingement - -   Internal impingement - -   Biceps      Speed's - +   Rotator Cuff Tear     Drop Arm - -   Belly Press - -   Lift off  - -     Assessment/Plan:    Patient is a 71 year old male with right side shoulder complaints.    Patient has the following significant findings with corresponding treatment plan.                Diagnosis 1:  Right shoulder pain due to rotator cuff tear    Pain -  hot/cold therapy, self management, education and home program  Decreased ROM/flexibility - manual therapy, therapeutic exercise and home program  Decreased strength - therapeutic exercise, therapeutic activities and home program  Decreased proprioception - neuro re-education, therapeutic activities and home program  Decreased function - therapeutic activities and home program    Therapy Evaluation Codes:   1) History comprised of:   Personal factors that impact the plan of care:      None.    Comorbidity factors that impact the plan of care are:      High blood pressure.     Medications impacting care: High  blood pressure and Muscle relaxant.  2) Examination of Body Systems comprised of:   Body structures and functions that impact the plan of care:      Shoulder.   Activity limitations that impact the plan of care are:      Driving, Lifting, Working and Sleeping.  3) Clinical presentation characteristics are:   Stable/Uncomplicated.  4) Decision-Making    Low complexity using standardized patient assessment instrument and/or measureable assessment of functional outcome.    Cumulative Therapy Evaluation is: Low complexity.  Previous and current functional limitations:  (See Goal Flow Sheet for this information)    Short term and Long term goals: (See Goal Flow Sheet for this information)   Communication ability:  Patient appears to be able to clearly communicate and understand verbal and written communication and follow directions correctly.  Treatment Explanation - The following has been discussed with the patient:   RX ordered/plan of care  Anticipated outcomes  Possible risks and side effects  This patient would benefit from PT intervention to resume normal activities.   Rehab potential is good.    Frequency:  1 X week, once daily  Duration:  for 6 weeks  Discharge Plan:  Achieve all LTG.  Independent in home treatment program.  Reach maximal therapeutic benefit.    Thank you for your referral.    INQUIRIES  Therapist: Leonel Bar, PT, DPT, [facility] [place] [facility]  [address]  [place] [address]  Phone: [phone]  Fax: [phone]

## 2021-10-05 NOTE — PROGRESS NOTES
Physical Therapy Initial Evaluation  Subjective:  Magdaleno Dutton is a 71 year old male with a right shoulder condition.    The condition occurred due to forced shoulder abduction.  The condition occurred at home.  This is a new on chronic condition.    Mechanism/History of injury/symptoms:  10/1/21 patient received MD orders for PT for right shoulder pain due to a rotator cuff tear and biceps tendon tear.  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  The pain is located biceps, anterior and the quality of pain is reported as aching or sharp.  The degree of pain is reported as 2/10. The timing of pain/symptoms is intermittent, not dependent on time of day. Associated symptoms include: weakness, loss of motion    Symptoms are exacerbated by: reaching away from body, lifting away from body or overhead.  Symptoms are relieved by: rest, ibuprofen.  Since onset symptoms are gradually improving.    Special tests for this condition include: x-ray, MRI.  Previous treatments for this condition include: none.    General health as reported by patient is good.  Pertinent medical history includes: high blood pressure.  Medical allergies include: none.  Other surgeries include: none.  Current medications:  High blood pressure, muscle relaxants    Current occupation: retired, farming.  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    Potential barriers to physical therapy: none.  Red flags: none.    Previous level of function: able to reach/lift away from body and overhead without shoulder pain  Current functional restrictions:  Unable to reach/lift away from body without shoulder pain, limited strength lifting away from body or overhead,  pain sleeping on right side or prolonged use away from body    HPI                    Objective:  SHOULDER:   PROM L PROM R AROM L AROM R MMT L MMT R   Flex  160  160 160 with painful arc 5/5 4-/5   Abd  160  160 160 with painful arc 5/5 4+/5   Full Can     5/5 4-/5   ER  65 65 45 5/5 4+/5   Ext/IR   L2 L2 5/5 5/5     Scapulothoraic Rhythm: RIGHT scapular dyskinesis noted with active elevation    Palpation: Mild tenderness about right biceps and posterior shoulder    Special tests:   L R   Impingement     Neer's - -   Hawkin's-Tu - +   Coracoid Impingement - -   Internal impingement - -   Biceps      Speed's - +   Rotator Cuff Tear     Drop Arm - -   Belly Press - -   Lift off  - -         System    Physical Exam    General     ROS    Assessment/Plan:    Patient is a 71 year old male with right side shoulder complaints.    Patient has the following significant findings with corresponding treatment plan.                Diagnosis 1:  Right shoulder pain due to rotator cuff tear    Pain -  hot/cold therapy, self management, education and home program  Decreased ROM/flexibility - manual therapy, therapeutic exercise and home program  Decreased strength - therapeutic exercise, therapeutic activities and home program  Decreased proprioception - neuro re-education, therapeutic activities and home program  Decreased function - therapeutic activities and home program    Therapy Evaluation Codes:   1) History comprised of:   Personal factors that impact the plan of care:      None.    Comorbidity factors that impact the plan of care are:      High blood pressure.     Medications impacting care: High blood pressure and Muscle relaxant.  2) Examination of Body Systems comprised of:   Body structures and functions that impact the plan of care:      Shoulder.   Activity limitations that impact the plan of care are:      Driving, Lifting, Working and Sleeping.  3) Clinical presentation characteristics  are:   Stable/Uncomplicated.  4) Decision-Making    Low complexity using standardized patient assessment instrument and/or measureable assessment of functional outcome.  Cumulative Therapy Evaluation is: Low complexity.    Previous and current functional limitations:  (See Goal Flow Sheet for this information)    Short term and Long term goals: (See Goal Flow Sheet for this information)     Communication ability:  Patient appears to be able to clearly communicate and understand verbal and written communication and follow directions correctly.  Treatment Explanation - The following has been discussed with the patient:   RX ordered/plan of care  Anticipated outcomes  Possible risks and side effects  This patient would benefit from PT intervention to resume normal activities.   Rehab potential is good.    Frequency:  1 X week, once daily  Duration:  for 6 weeks  Discharge Plan:  Achieve all LTG.  Independent in home treatment program.  Reach maximal therapeutic benefit.    Please refer to the daily flowsheet for treatment today, total treatment time and time spent performing 1:1 timed codes.

## 2021-10-06 PROBLEM — G89.29 CHRONIC RIGHT SHOULDER PAIN: Status: ACTIVE | Noted: 2021-10-06

## 2021-10-06 PROBLEM — M25.511 CHRONIC RIGHT SHOULDER PAIN: Status: ACTIVE | Noted: 2021-10-06

## 2021-10-13 ENCOUNTER — THERAPY VISIT (OUTPATIENT)
Dept: PHYSICAL THERAPY | Facility: CLINIC | Age: 71
End: 2021-10-13
Payer: COMMERCIAL

## 2021-10-13 DIAGNOSIS — G89.29 CHRONIC RIGHT SHOULDER PAIN: ICD-10-CM

## 2021-10-13 DIAGNOSIS — M25.511 CHRONIC RIGHT SHOULDER PAIN: ICD-10-CM

## 2021-10-13 PROCEDURE — 97110 THERAPEUTIC EXERCISES: CPT | Mod: GP | Performed by: PHYSICAL THERAPIST

## 2021-10-13 PROCEDURE — 97112 NEUROMUSCULAR REEDUCATION: CPT | Mod: GP | Performed by: PHYSICAL THERAPIST

## 2021-10-22 ENCOUNTER — IMMUNIZATION (OUTPATIENT)
Dept: FAMILY MEDICINE | Facility: CLINIC | Age: 71
End: 2021-10-22
Payer: COMMERCIAL

## 2021-10-22 PROCEDURE — G0008 ADMIN INFLUENZA VIRUS VAC: HCPCS

## 2021-10-22 PROCEDURE — 90662 IIV NO PRSV INCREASED AG IM: CPT

## 2021-10-27 ENCOUNTER — THERAPY VISIT (OUTPATIENT)
Dept: PHYSICAL THERAPY | Facility: CLINIC | Age: 71
End: 2021-10-27
Payer: COMMERCIAL

## 2021-10-27 DIAGNOSIS — M25.511 CHRONIC RIGHT SHOULDER PAIN: ICD-10-CM

## 2021-10-27 DIAGNOSIS — G89.29 CHRONIC RIGHT SHOULDER PAIN: ICD-10-CM

## 2021-10-27 PROCEDURE — 97110 THERAPEUTIC EXERCISES: CPT | Mod: GP | Performed by: PHYSICAL THERAPIST

## 2021-10-27 PROCEDURE — 97112 NEUROMUSCULAR REEDUCATION: CPT | Mod: GP | Performed by: PHYSICAL THERAPIST

## 2021-10-27 PROCEDURE — 97010 HOT OR COLD PACKS THERAPY: CPT | Mod: GP | Performed by: PHYSICAL THERAPIST

## 2021-11-03 ENCOUNTER — THERAPY VISIT (OUTPATIENT)
Dept: PHYSICAL THERAPY | Facility: CLINIC | Age: 71
End: 2021-11-03
Payer: COMMERCIAL

## 2021-11-03 DIAGNOSIS — M25.511 CHRONIC RIGHT SHOULDER PAIN: ICD-10-CM

## 2021-11-03 DIAGNOSIS — G89.29 CHRONIC RIGHT SHOULDER PAIN: ICD-10-CM

## 2021-11-03 PROCEDURE — 97112 NEUROMUSCULAR REEDUCATION: CPT | Mod: GP | Performed by: PHYSICAL THERAPIST

## 2021-11-03 PROCEDURE — 97010 HOT OR COLD PACKS THERAPY: CPT | Mod: GP | Performed by: PHYSICAL THERAPIST

## 2021-11-03 PROCEDURE — 97110 THERAPEUTIC EXERCISES: CPT | Mod: GP | Performed by: PHYSICAL THERAPIST

## 2021-11-19 ENCOUNTER — THERAPY VISIT (OUTPATIENT)
Dept: PHYSICAL THERAPY | Facility: CLINIC | Age: 71
End: 2021-11-19
Payer: COMMERCIAL

## 2021-11-19 DIAGNOSIS — M25.511 CHRONIC RIGHT SHOULDER PAIN: ICD-10-CM

## 2021-11-19 DIAGNOSIS — G89.29 CHRONIC RIGHT SHOULDER PAIN: ICD-10-CM

## 2021-11-19 PROCEDURE — 97112 NEUROMUSCULAR REEDUCATION: CPT | Mod: GP | Performed by: PHYSICAL THERAPIST

## 2021-11-19 PROCEDURE — 97110 THERAPEUTIC EXERCISES: CPT | Mod: GP | Performed by: PHYSICAL THERAPIST

## 2021-11-19 PROCEDURE — 97010 HOT OR COLD PACKS THERAPY: CPT | Mod: GP | Performed by: PHYSICAL THERAPIST

## 2021-12-01 ENCOUNTER — THERAPY VISIT (OUTPATIENT)
Dept: PHYSICAL THERAPY | Facility: CLINIC | Age: 71
End: 2021-12-01
Payer: COMMERCIAL

## 2021-12-01 DIAGNOSIS — M25.511 CHRONIC RIGHT SHOULDER PAIN: ICD-10-CM

## 2021-12-01 DIAGNOSIS — G89.29 CHRONIC RIGHT SHOULDER PAIN: ICD-10-CM

## 2021-12-01 PROCEDURE — 97110 THERAPEUTIC EXERCISES: CPT | Mod: GP | Performed by: PHYSICAL THERAPIST

## 2021-12-01 PROCEDURE — 97112 NEUROMUSCULAR REEDUCATION: CPT | Mod: GP | Performed by: PHYSICAL THERAPIST

## 2021-12-01 NOTE — LETTER
[facility]  [address]  [place] [address]  [phone]    2021    Re: Magdaleno Dutton   :   1950  MRN:  [id number]   REFERRING PHYSICIAN:   Katerine VANCE [facility]    Date of Initial Evaluation:  10/5/21  Visits:  Rxs Used: 6  Reason for Referral:  Chronic right shoulder pain    PROGRESS  REPORT  Progress reporting period is from 10/5/21 to 21.       SUBJECTIVE  Subjective changes noted by patient: Art reports his shoulder is signficantly better than it was prior to starting this round of PT, despite falling a and suffering a setback a few weeks ago.  He has much less pain and better motion allowing for improved function.  He reports he can lift about 3-5 pounds overhead with his right arm with some pain. If it is heavier he needs help with his other arm.  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      Current pain level is 1/10.     Previous pain level was 2/10.   Changes in function:  Yes, see above  Adverse reaction to treatment or activity: None    OBJECTIVE  Changes noted in objective findings:  Yes  Right shoulder AROM:     flexion 160    abduction 160     ER 45-50     IR/EXT to T12    Right shoulder strength:     flexion 5-/5 with pain    scaption 5-/5    ER 4-/5     IR 5/5      ASSESSMENT/PLAN  Updated problem list and treatment plan: Diagnosis 1:  Right shoulder pain    Pain -  hot/cold therapy, manual therapy, self management, education and home program  Decreased ROM/flexibility - manual therapy, therapeutic exercise and home program  Decreased strength - therapeutic exercise, therapeutic activities and home program  Decreased proprioception - neuro re-education, therapeutic activities and home program  Decreased function - therapeutic activities and home program  STG/LTGs have been met or progress has been made  towards goals:  Yes (See Goal flow sheet completed today.)  Assessment of Progress: The patient's condition is improving.  The patient's condition has potential to improve.  Patient is meeting short term goals and is progressing towards long term goals.  Self Management Plans:  Patient has been instructed in a home treatment program.  Patient  has been instructed in self management of symptoms.    Magdaleno continues to require the following intervention to meet STG and LTG's:  PT    Recommendations:  This patient would benefit from continued therapy.     Frequency:  2 X a month, once daily  Duration:  for 2 months    Thank you for your referral.    INQUIRIES  Therapist Leonel Bar, PT, DPT, SCS   [facility] [place] [facility] [place]  [address]  [place] [address]  Phone: [phone]  Fax: [phone]

## 2021-12-01 NOTE — PROGRESS NOTES
Subjective:  HPI  Physical Exam                    Objective:  System    Physical Exam    General     ROS    Assessment/Plan:    PROGRESS  REPORT    Progress reporting period is from 10/5/21 to 12/1/21.       SUBJECTIVE  Subjective changes noted by patient: Art reports his shoulder is signficantly better than it was prior to starting this round of PT, despite falling a and suffering a setback a few weeks ago.  He has much less pain and better motion allowing for improved function.  He reports he can lift about 3-5 pounds overhead with his right arm with some pain. If it is heavier he needs help with his other arm.  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      Current pain level is 1/10.     Previous pain level was 2/10.   Changes in function:  Yes, see above  Adverse reaction to treatment or activity: None    OBJECTIVE  Changes noted in objective findings:  Yes  Right shoulder AROM:     flexion 160    abduction 160     ER 45-50     IR/EXT to T12    Right shoulder strength:     flexion 5-/5 with pain    scaption 5-/5    ER 4-/5     IR 5/5      ASSESSMENT/PLAN  Updated problem list and treatment plan: Diagnosis 1:  Right shoulder pain    Pain -  hot/cold therapy, manual therapy, self management, education and home program  Decreased ROM/flexibility - manual therapy, therapeutic exercise and home program  Decreased strength - therapeutic exercise, therapeutic activities and home program  Decreased proprioception - neuro re-education, therapeutic activities and home program  Decreased function - therapeutic activities and home program  STG/LTGs have been met or progress has been made towards goals:  Yes (See Goal flow sheet completed today.)  Assessment of Progress: The patient's condition is improving.  The patient's condition has potential to improve.  Patient is meeting short term goals and is progressing towards long term goals.  Self Management Plans:   Patient has been instructed in a home treatment program.  Patient  has been instructed in self management of symptoms.    Magdaleno continues to require the following intervention to meet STG and LTG's:  PT    Recommendations:  This patient would benefit from continued therapy.     Frequency:  2 X a month, once daily  Duration:  for 2 months        Please refer to the daily flowsheet for treatment today, total treatment time and time spent performing 1:1 timed codes.

## 2021-12-31 ENCOUNTER — THERAPY VISIT (OUTPATIENT)
Dept: PHYSICAL THERAPY | Facility: CLINIC | Age: 71
End: 2021-12-31
Payer: COMMERCIAL

## 2021-12-31 DIAGNOSIS — M25.511 CHRONIC RIGHT SHOULDER PAIN: ICD-10-CM

## 2021-12-31 DIAGNOSIS — G89.29 CHRONIC RIGHT SHOULDER PAIN: ICD-10-CM

## 2021-12-31 PROCEDURE — 97110 THERAPEUTIC EXERCISES: CPT | Mod: GP | Performed by: PHYSICAL THERAPIST

## 2021-12-31 PROCEDURE — 97112 NEUROMUSCULAR REEDUCATION: CPT | Mod: GP | Performed by: PHYSICAL THERAPIST

## 2022-01-14 ENCOUNTER — THERAPY VISIT (OUTPATIENT)
Dept: PHYSICAL THERAPY | Facility: CLINIC | Age: 72
End: 2022-01-14
Payer: COMMERCIAL

## 2022-01-14 DIAGNOSIS — M25.511 CHRONIC RIGHT SHOULDER PAIN: ICD-10-CM

## 2022-01-14 DIAGNOSIS — G89.29 CHRONIC RIGHT SHOULDER PAIN: ICD-10-CM

## 2022-01-14 PROCEDURE — 97112 NEUROMUSCULAR REEDUCATION: CPT | Mod: GP | Performed by: PHYSICAL THERAPIST

## 2022-01-14 PROCEDURE — 97110 THERAPEUTIC EXERCISES: CPT | Mod: GP | Performed by: PHYSICAL THERAPIST

## 2022-01-28 ENCOUNTER — THERAPY VISIT (OUTPATIENT)
Dept: PHYSICAL THERAPY | Facility: CLINIC | Age: 72
End: 2022-01-28
Payer: COMMERCIAL

## 2022-01-28 DIAGNOSIS — G89.29 CHRONIC RIGHT SHOULDER PAIN: ICD-10-CM

## 2022-01-28 DIAGNOSIS — M25.511 CHRONIC RIGHT SHOULDER PAIN: ICD-10-CM

## 2022-01-28 PROCEDURE — 97110 THERAPEUTIC EXERCISES: CPT | Mod: GP | Performed by: PHYSICAL THERAPIST

## 2022-01-28 PROCEDURE — 97112 NEUROMUSCULAR REEDUCATION: CPT | Mod: GP | Performed by: PHYSICAL THERAPIST

## 2022-02-18 ENCOUNTER — THERAPY VISIT (OUTPATIENT)
Dept: PHYSICAL THERAPY | Facility: CLINIC | Age: 72
End: 2022-02-18
Payer: COMMERCIAL

## 2022-02-18 DIAGNOSIS — G89.29 CHRONIC RIGHT SHOULDER PAIN: ICD-10-CM

## 2022-02-18 DIAGNOSIS — M25.511 CHRONIC RIGHT SHOULDER PAIN: ICD-10-CM

## 2022-02-18 PROCEDURE — 97112 NEUROMUSCULAR REEDUCATION: CPT | Mod: GP | Performed by: PHYSICAL THERAPIST

## 2022-02-18 PROCEDURE — 97110 THERAPEUTIC EXERCISES: CPT | Mod: GP | Performed by: PHYSICAL THERAPIST

## 2022-02-18 NOTE — PROGRESS NOTES
Subjective:  HPI  Physical Exam                    Objective:  System    Physical Exam    General     ROS    Assessment/Plan:    PROGRESS  REPORT    Progress reporting period is from 12/1/21 to 2/18/22.       SUBJECTIVE  Subjective changes noted by patient:  Art reports his shoulder is better than it was a few months ago but not as good as it was a few weeks ago.  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  He is getting back up to where he used to be but is not yet fully there.  He had to back off a few of his exercises for a while but is now back to doing them all.    Current pain level is 1/10.     Previous pain level was 2/10.   Changes in function:  Yes, see above.  Adverse reaction to treatment or activity: activity - fall onto right shoulder    OBJECTIVE  Changes noted in objective findings:  Yes  Right shoulder AROM: (PROM full)   Flexion 150 with mid-range pain    abduction 150 with mild mid-range pain     ER 45    IR/EXT to T12    Right shoulder strength:    flexion 4+/5 with pain    scaption 5-/5     ER 4/5    IR 5/5     ASSESSMENT/PLAN  Updated problem list and treatment plan: Diagnosis 1:  Right shoulder pain    Pain -  hot/cold therapy, manual therapy, self management, education and home program  Decreased ROM/flexibility - manual therapy, therapeutic exercise and home program  Decreased strength - therapeutic exercise, therapeutic activities and home program  Decreased proprioception - neuro re-education, therapeutic activities and home program  Decreased function - therapeutic activities and home program  STG/LTGs have been met or progress has been made towards goals:  Yes (See Goal flow sheet completed today.)  Assessment of Progress: The patient's condition has potential to improve.  The patient has had set backs in their progress.  Self Management Plans:  Patient has been instructed in a home treatment program.  Patient  has  been instructed in self management of symptoms.    Magdaleno continues to require the following intervention to meet STG and LTG's:  PT    Recommendations:  This patient would benefit from continued therapy.     Frequency:  2 X a month, once daily  Duration:  for 2 months        Please refer to the daily flowsheet for treatment today, total treatment time and time spent performing 1:1 timed codes.

## 2022-02-18 NOTE — LETTER
[facility]  [address]  [place] [address]  [phone]    2022    Re: Magdaleno Dutton   :   1950  MRN:  [id number]   REFERRING PHYSICIAN:   Katerine VANCE [facility]    Date of Initial Evaluation:  21  Visits:  Rxs Used: 10  Reason for Referral:  Chronic right shoulder pain    PROGRESS  REPORT  Progress reporting period is from 21 to 22.       SUBJECTIVE  Subjective changes noted by patient:  Art reports his shoulder is better than it was a few months ago but not as good as it was a few weeks ago.  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  He is getting back up to where he used to be but is not yet fully there.  He had to back off a few of his exercises for a while but is now back to doing them all.    Current pain level is 1/10.     Previous pain level was 2/10.   Changes in function:  Yes, see above.  Adverse reaction to treatment or activity: activity - fall onto right shoulder    OBJECTIVE  Changes noted in objective findings:  Yes  Right shoulder AROM: (PROM full)   Flexion 150 with mid-range pain    abduction 150 with mild mid-range pain     ER 45    IR/EXT to T12    Right shoulder strength:    flexion 4+/5 with pain    scaption 5-/5     ER 4/5    IR 5/5     ASSESSMENT/PLAN  Updated problem list and treatment plan: Diagnosis 1:  Right shoulder pain    Pain -  hot/cold therapy, manual therapy, self management, education and home program  Decreased ROM/flexibility - manual therapy, therapeutic exercise and home program  Decreased strength - therapeutic exercise, therapeutic activities and home program  Decreased proprioception - neuro re-education, therapeutic activities and home program  Decreased function - therapeutic activities and home program  STG/LTGs have been met or progress has been made  towards goals:  Yes (See Goal flow sheet completed today.)  Assessment of Progress: The patient's condition has potential to improve.  The patient has had set backs in their progress.  Self Management Plans:  Patient has been instructed in a home treatment program.  Patient  has been instructed in self management of symptoms.    Magdaleno continues to require the following intervention to meet STG and LTG's:  PT    Recommendations:  This patient would benefit from continued therapy.     Frequency:  2 X a month, once daily  Duration:  for 2 months    Thank you for your referral.    INQUIRIES  Therapist: Leonel Bar, PT, DPT, SCS   [facility] [place] [facility] [place]  [address]  [place] [address]  Phone: [phone]  Fax: [phone]

## 2022-03-03 DIAGNOSIS — I10 HYPERTENSION GOAL BP (BLOOD PRESSURE) < 140/90: ICD-10-CM

## 2022-03-07 RX ORDER — LISINOPRIL 20 MG/1
TABLET ORAL
Qty: 30 TABLET | Refills: 0 | Status: SHIPPED | OUTPATIENT
Start: 2022-03-07 | End: 2022-03-25

## 2022-03-07 NOTE — TELEPHONE ENCOUNTER
"Message left for pt to call and schedule yearly exam.   Ginger Brito RN     Requested Prescriptions   Pending Prescriptions Disp Refills    lisinopril (ZESTRIL) 20 MG tablet [Pharmacy Med Name: LISINOPRIL 20MG TABS] 90 tablet 2     Sig: TAKE ONE TABLET BY MOUTH ONCE DAILY        ACE Inhibitors (Including Combos) Protocol Failed - 3/7/2022  2:49 PM        Failed - Blood pressure under 140/90 in past 12 months       BP Readings from Last 3 Encounters:   02/12/21 136/70   12/14/20 110/80   09/23/20 126/74                 Failed - Normal serum creatinine on file in past 12 months     Recent Labs   Lab Test 02/12/21  0943   CR 0.94       Ok to refill medication if creatinine is low          Failed - Normal serum potassium on file in past 12 months     Recent Labs   Lab Test 02/12/21  0943   POTASSIUM 4.1               Passed - Recent (12 mo) or future (30 days) visit within the authorizing provider's specialty     Patient has had an office visit with the authorizing provider or a provider within the authorizing providers department within the previous 12 mos or has a future within next 30 days. See \"Patient Info\" tab in inbasket, or \"Choose Columns\" in Meds & Orders section of the refill encounter.              Passed - Medication is active on med list        Passed - Patient is age 18 or older              "

## 2022-03-08 NOTE — TELEPHONE ENCOUNTER
Please call and notify patient due for office visit, have refilled medication x 1 month.     Thanks,  Anju Schofield, DNP, APRN-CNP

## 2022-03-11 ENCOUNTER — THERAPY VISIT (OUTPATIENT)
Dept: PHYSICAL THERAPY | Facility: CLINIC | Age: 72
End: 2022-03-11
Payer: COMMERCIAL

## 2022-03-11 DIAGNOSIS — M25.511 CHRONIC RIGHT SHOULDER PAIN: ICD-10-CM

## 2022-03-11 DIAGNOSIS — G89.29 CHRONIC RIGHT SHOULDER PAIN: ICD-10-CM

## 2022-03-11 PROCEDURE — 97110 THERAPEUTIC EXERCISES: CPT | Mod: GP | Performed by: PHYSICAL THERAPIST

## 2022-03-11 PROCEDURE — 97112 NEUROMUSCULAR REEDUCATION: CPT | Mod: GP | Performed by: PHYSICAL THERAPIST

## 2022-03-11 PROCEDURE — 97010 HOT OR COLD PACKS THERAPY: CPT | Mod: GP | Performed by: PHYSICAL THERAPIST

## 2022-03-25 ENCOUNTER — OFFICE VISIT (OUTPATIENT)
Dept: FAMILY MEDICINE | Facility: CLINIC | Age: 72
End: 2022-03-25
Payer: COMMERCIAL

## 2022-03-25 VITALS
SYSTOLIC BLOOD PRESSURE: 130 MMHG | TEMPERATURE: 97.6 F | WEIGHT: 193 LBS | RESPIRATION RATE: 16 BRPM | OXYGEN SATURATION: 98 % | HEIGHT: 65 IN | BODY MASS INDEX: 32.15 KG/M2 | HEART RATE: 63 BPM | DIASTOLIC BLOOD PRESSURE: 80 MMHG

## 2022-03-25 DIAGNOSIS — Z12.11 SCREEN FOR COLON CANCER: ICD-10-CM

## 2022-03-25 DIAGNOSIS — I10 HYPERTENSION GOAL BP (BLOOD PRESSURE) < 140/90: ICD-10-CM

## 2022-03-25 DIAGNOSIS — Z00.00 ENCOUNTER FOR MEDICARE ANNUAL WELLNESS EXAM: Primary | ICD-10-CM

## 2022-03-25 DIAGNOSIS — F10.20 UNCOMPLICATED ALCOHOL DEPENDENCE (H): ICD-10-CM

## 2022-03-25 LAB
ANION GAP SERPL CALCULATED.3IONS-SCNC: 7 MMOL/L (ref 3–14)
BUN SERPL-MCNC: 17 MG/DL (ref 7–30)
CALCIUM SERPL-MCNC: 9.3 MG/DL (ref 8.5–10.1)
CHLORIDE BLD-SCNC: 108 MMOL/L (ref 94–109)
CO2 SERPL-SCNC: 23 MMOL/L (ref 20–32)
CREAT SERPL-MCNC: 0.7 MG/DL (ref 0.66–1.25)
GFR SERPL CREATININE-BSD FRML MDRD: >90 ML/MIN/1.73M2
GLUCOSE BLD-MCNC: 104 MG/DL (ref 70–99)
POTASSIUM BLD-SCNC: 4.1 MMOL/L (ref 3.4–5.3)
SODIUM SERPL-SCNC: 138 MMOL/L (ref 133–144)

## 2022-03-25 PROCEDURE — 99397 PER PM REEVAL EST PAT 65+ YR: CPT | Performed by: NURSE PRACTITIONER

## 2022-03-25 PROCEDURE — 80048 BASIC METABOLIC PNL TOTAL CA: CPT | Performed by: NURSE PRACTITIONER

## 2022-03-25 PROCEDURE — 36415 COLL VENOUS BLD VENIPUNCTURE: CPT | Performed by: NURSE PRACTITIONER

## 2022-03-25 RX ORDER — LISINOPRIL 20 MG/1
20 TABLET ORAL DAILY
Qty: 90 TABLET | Refills: 3 | Status: SHIPPED | OUTPATIENT
Start: 2022-03-25 | End: 2023-03-31

## 2022-03-25 ASSESSMENT — PAIN SCALES - GENERAL: PAINLEVEL: NO PAIN (0)

## 2022-03-25 ASSESSMENT — ACTIVITIES OF DAILY LIVING (ADL): CURRENT_FUNCTION: NO ASSISTANCE NEEDED

## 2022-03-25 NOTE — PROGRESS NOTES
"SUBJECTIVE:   Magdaleno Dutton is a 72 year old male who presents for Preventive Visit.      Patient has been advised of split billing requirements and indicates understanding: Yes  Are you in the first 12 months of your Medicare coverage?  No    Healthy Habits:    In general, how would you rate your overall health?  Very good    Frequency of exercise:  2-3 days/week    Duration of exercise:  15-30 minutes    Do you usually eat at least 4 servings of fruit and vegetables a day, include whole grains    & fiber and avoid regularly eating high fat or \"junk\" foods?  No    Taking medications regularly:  Yes    Barriers to taking medications:  Not applicable    Medication side effects:  Not applicable    Ability to successfully perform activities of daily living:  No assistance needed    Home Safety:  No safety concerns identified    Hearing Impairment:  Difficulty understanding speech on the telephone    In the past 6 months, have you been bothered by leaking of urine?  No    In general, how would you rate your overall mental or emotional health?  Very good      PHQ-2 Total Score:    Additional concerns today:  Yes    Do you feel safe in your environment? Yes    Have you ever done Advance Care Planning? (For example, a Health Directive, POLST, or a discussion with a medical provider or your loved ones about your wishes): Not discussed       Fall risk  Fallen 2 or more times in the past year?: No  Any fall with injury in the past year?: No    Cognitive Screening   1) Repeat 3 items (Leader, Season, Table)    2) Clock draw: NORMAL  3) 3 item recall: Recalls 2 objects   Results: NORMAL clock, 1-2 items recalled: COGNITIVE IMPAIRMENT LESS LIKELY    Mini-CogTM Dolores Potts. Licensed by the author for use in [facility]; reprinted with permission ([email or web address]). All rights reserved.      Do you have sleep apnea, excessive snoring or daytime drowsiness?: no    Reviewed and updated as needed this visit by " clinical staff   Tobacco  Allergies  Meds  Problems  Med Hx  Surg Hx  Fam Hx  Soc   Hx        Reviewed and updated as needed this visit by Provider    Allergies  Meds  Problems            Social History     Tobacco Use     Smoking status: Former Smoker     Quit date: 2001     Years since quittin.2     Smokeless tobacco: Never Used   Substance Use Topics     Alcohol use: Yes     Alcohol/week: 28.0 standard drinks     Types: 28 Cans of beer per week     Comment: 3-5 beers at night      If you drink alcohol do you typically have >3 drinks per day or >7 drinks per week? No    No flowsheet data found.      Hypertension Follow-up      Do you check your blood pressure regularly outside of the clinic? No     Are you following a low salt diet? No    Are your blood pressures ever more than 140 on the top number (systolic) OR more   than 90 on the bottom number (diastolic), for example 140/90? Not checking at home       Current providers sharing in care for this patient include:   Patient Care Team:  Anju Schofield APRN CNP as PCP - General (Nurse Practitioner - Family)  Anju Schofield APRN CNP as Assigned PCP    The following health maintenance items are reviewed in Epic and correct as of today:  Health Maintenance Due   Topic Date Due     COLORECTAL CANCER SCREENING  Never done     ZOSTER IMMUNIZATION (1 of 2) Never done     FALL RISK ASSESSMENT  2021     ADVANCE CARE PLANNING  2021     Lab work is in process  Labs reviewed in EPIC  BP Readings from Last 3 Encounters:   22 130/80   21 136/70   20 110/80    Wt Readings from Last 3 Encounters:   22 87.5 kg (193 lb)   21 86.9 kg (191 lb 9.6 oz)   20 84.8 kg (187 lb)                  Patient Active Problem List   Diagnosis     Hyperlipidemia LDL goal <160     Advanced directives, counseling/discussion     Hypertension goal BP (blood pressure) < 140/90     Chronic right shoulder pain     Alcohol  "dependence (H)     History reviewed. No pertinent surgical history.    Social History     Tobacco Use     Smoking status: Former Smoker     Quit date: 2001     Years since quittin.2     Smokeless tobacco: Never Used   Substance Use Topics     Alcohol use: Yes     Alcohol/week: 28.0 standard drinks     Types: 28 Cans of beer per week     Comment: 3-5 beers at night      Family History   Problem Relation Age of Onset     Cancer Father      Diabetes Sister      Diabetes Sister          Current Outpatient Medications   Medication Sig Dispense Refill     Ascorbic Acid (VITAMIN C PO)        ASPIRIN PO Take 81 mg by mouth daily        Cholecalciferol (VITAMIN D PO)        lisinopril (ZESTRIL) 20 MG tablet Take 1 tablet (20 mg) by mouth daily 90 tablet 3     No Known Allergies        Review of Systems  Constitutional, HEENT, cardiovascular, pulmonary, gi and gu systems are negative, except as otherwise noted.    OBJECTIVE:   /80   Pulse 63   Temp 97.6  F (36.4  C) (Tympanic)   Resp 16   Ht 1.651 m (5' 5\")   Wt 87.5 kg (193 lb)   SpO2 98%   BMI 32.12 kg/m   Estimated body mass index is 32.12 kg/m  as calculated from the following:    Height as of this encounter: 1.651 m (5' 5\").    Weight as of this encounter: 87.5 kg (193 lb).  Physical Exam  GENERAL: healthy, alert and no distress  EYES: Eyes grossly normal to inspection, PERRL and conjunctivae and sclerae normal  HENT: ear canals and TM's normal, nose and mouth without ulcers or lesions  NECK: no adenopathy, no asymmetry, masses, or scars and thyroid normal to palpation  RESP: lungs clear to auscultation - no rales, rhonchi or wheezes  CV: regular rate and rhythm, normal S1 S2, no S3 or S4, no murmur, click or rub, no peripheral edema and peripheral pulses strong  ABDOMEN: soft, nontender, no hepatosplenomegaly, no masses and bowel sounds normal  MS: no gross musculoskeletal defects noted, no edema  SKIN: no suspicious lesions or rashes  NEURO: " "Normal strength and tone, mentation intact and speech normal  PSYCH: mentation appears normal, affect normal/bright    Diagnostic Test Results:  Labs reviewed in Epic  Pending     ASSESSMENT / PLAN:   (Z00.00) Encounter for Medicare annual wellness exam  (primary encounter diagnosis)  Comment: Healthy 72 y.o. male    (I10) Hypertension goal BP (blood pressure) < 140/90  Comment: Chronic, stable. Blood pressure below goal today. Will recheck labs today, will notify you of results. Continue Lisinopril without any changes.   Plan: lisinopril (ZESTRIL) 20 MG tablet, Basic         metabolic panel  (Ca, Cl, CO2, Creat, Gluc, K,         Na, BUN)          (F10.20) Uncomplicated alcohol dependence (H)  Comment: Drinks approximately 3-5 beers a night. Discussed trying to decrease intake due to risk of falls and decreased healing of shoulder.     (Z12.11) Screen for colon cancer  Comment: Screen, patient has FIT test at home. Advised patient to complete and return to clinic.       Patient has been advised of split billing requirements and indicates understanding: Yes    COUNSELING:  Reviewed preventive health counseling, as reflected in patient instructions    Estimated body mass index is 32.12 kg/m  as calculated from the following:    Height as of this encounter: 1.651 m (5' 5\").    Weight as of this encounter: 87.5 kg (193 lb).    Weight management plan: Discussed healthy diet and exercise guidelines    He reports that he quit smoking about 21 years ago. He has never used smokeless tobacco.      Appropriate preventive services were discussed with this patient, including applicable screening as appropriate for cardiovascular disease, diabetes, osteopenia/osteoporosis, and glaucoma.  As appropriate for age/gender, discussed screening for colorectal cancer, prostate cancer, breast cancer, and cervical cancer. Checklist reviewing preventive services available has been given to the patient.    Reviewed patients plan of care and " provided an AVS. The Basic Care Plan (routine screening as documented in Health Maintenance) for Magdaleno meets the Care Plan requirement. This Care Plan has been established and reviewed with the Patient.    Counseling Resources:  ATP IV Guidelines  Pooled Cohorts Equation Calculator  Breast Cancer Risk Calculator  Breast Cancer: Medication to Reduce Risk  FRAX Risk Assessment  ICSI Preventive Guidelines  Dietary Guidelines for Americans, 2010  USDA's MyPlate  ASA Prophylaxis  Lung CA Screening    Anju Schofield, PALOMA, APRN-CNP   [facility]    Identified Health Risks:    The patient was counseled and encouraged to consider modifying their diet and eating habits. He was provided with information on recommended healthy diet options.  The patient was provided with written information regarding signs of hearing loss.    Chart documentation with Dragon Voice recognition Software. Although reviewed after completion, some words and grammatical errors may remain.

## 2022-03-25 NOTE — PATIENT INSTRUCTIONS
(Z00.00) Encounter for Medicare annual wellness exam  (primary encounter diagnosis)  Comment: Healthy 72 y.o. male    (I10) Hypertension goal BP (blood pressure) < 140/90  Comment: Chronic, stable. Blood pressure below goal today. Will recheck labs today, will notify you of results. Continue Lisinopril without any changes.   Plan: lisinopril (ZESTRIL) 20 MG tablet, Basic         metabolic panel  (Ca, Cl, CO2, Creat, Gluc, K,         Na, BUN)          (F10.20) Uncomplicated alcohol dependence (H)  Comment: Drinks approximately 3-5 beers a night. Discussed trying to decrease intake due to risk of falls and decreased healing of shoulder.     (Z12.11) Screen for colon cancer  Comment: Screen, patient has FIT test at home. Advised patient to complete and return to clinic.         Patient Education   Personalized Prevention Plan  You are due for the preventive services outlined below.  Your care team is available to assist you in scheduling these services.  If you have already completed any of these items, please share that information with your care team to update in your medical record.  Health Maintenance Due   Topic Date Due     Colorectal Cancer Screening  Never done     Zoster (Shingles) Vaccine (1 of 2) Never done     FALL RISK ASSESSMENT  09/23/2021     Discuss Advance Care Planning  12/19/2021       Understanding USDA MyPlate  The USDA has guidelines to help you make healthy food choices. These are called MyPlate. MyPlate shows the food groups that make up healthy meals using the image of a place setting. Before you eat, think about the healthiest choices for what to put on your plate or in your cup or bowl. To learn more about building a healthy plate, visit www.choosemyplate.gov.    The food groups    Fruits. Any fruit or 100% fruit juice counts as part of the Fruit Group. Fruits may be fresh, canned, frozen, or dried, and may be whole, cut-up, or pureed. Make 1/2 of your plate fruits and  vegetables.    Vegetables. Any vegetable or 100% vegetable juice counts as a member of the Vegetable Group. Vegetables may be fresh, frozen, canned, or dried. They can be served raw or cooked and may be whole, cut-up, or mashed. Make 1/2 of your plate fruits and vegetables.    Grains. All foods made from grains are part of the Grains Group. These include wheat, rice, oats, cornmeal, and barley. Grains are often used to make foods such as bread, pasta, oatmeal, cereal, tortillas, and grits. Grains should be no more than 1/4 of your plate. At least half of your grains should be whole grains.    Protein. This group includes meat, poultry, seafood, beans and peas, eggs, processed soy products (such as tofu), nuts (including nut butters), and seeds. Make protein choices no more than 1/4 of your plate. Meat and poultry choices should be lean or low fat.    Dairy. The Dairy Group includes all fluid milk products and foods made from milk that contain calcium, such as yogurt and cheese. (Foods that have little calcium, such as cream, butter, and cream cheese, are not part of this group.) Most dairy choices should be low-fat or fat-free.    Oils. Oils aren't a food group, but they do contain essential nutrients. However it's important to watch your intake of oils. These are fats that are liquid at room temperature. They include canola, corn, olive, soybean, vegetable, and sunflower oil. Foods that are mainly oil include mayonnaise, certain salad dressings, and soft margarines. You likely already get your daily oil allowance from the foods you eat.  Things to limit  Eating healthy also means limiting these things in your diet:       Salt (sodium). Many processed foods have a lot of sodium. To keep sodium intake down, eat fresh vegetables, meats, poultry, and seafood when possible. Purchase low-sodium, reduced-sodium, or no-salt-added food products at the store. And don't add salt to your meals at home. Instead, season them  with herbs and spices such as dill, oregano, cumin, and paprika. Or try adding flavor with lemon or lime zest and juice.    Saturated fat. Saturated fats are most often found in animal products such as beef, pork, and chicken. They are often solid at room temperature, such as butter. To reduce your saturated fat intake, choose leaner cuts of meat and poultry. And try healthier cooking methods such as grilling, broiling, roasting, or baking. For a simple lower-fat swap, use plain nonfat yogurt instead of mayonnaise when making potato salad or macaroni salad.    Added sugars. These are sugars added to foods. They are in foods such as ice cream, candy, soda, fruit drinks, sports drinks, energy drinks, cookies, pastries, jams, and syrups. Cut down on added sugars by sharing sweet treats with a family member or friend. You can also choose fruit for dessert, and drink water or other unsweetened beverages.     [employer] last reviewed this educational content on 6/1/2020 2000-2021 [employer]. All rights reserved. This information is not intended as a substitute for professional medical care. Always follow your healthcare professional's instructions.          Signs of Hearing Loss      Hearing much better with one ear can be a sign of hearing loss.   Hearing loss is a problem shared by many people. In fact, it is one of the most common health problems, particularly as people age. Most people age 65 and older have some hearing loss. By age 80, almost everyone does. Hearing loss often occurs slowly over the years. So you may not realize your hearing has gotten worse.  Have your hearing checked  Call your healthcare provider if you:    Have to strain to hear normal conversation    Have to watch other people s faces very carefully to follow what they re saying    Need to ask people to repeat what they ve said    Often misunderstand what people are saying    Turn the volume of the television or radio up so high  that others complain    Feel that people are mumbling when they re talking to you    Find that the effort to hear leaves you feeling tired and irritated    Notice, when using the phone, that you hear better with one ear than the other  [employer] last reviewed this educational content on 1/1/2020 2000-2021 [employer]. All rights reserved. This information is not intended as a substitute for professional medical care. Always follow your healthcare professional's instructions.

## 2022-04-04 ENCOUNTER — THERAPY VISIT (OUTPATIENT)
Dept: PHYSICAL THERAPY | Facility: CLINIC | Age: 72
End: 2022-04-04
Payer: COMMERCIAL

## 2022-04-04 DIAGNOSIS — G89.29 CHRONIC RIGHT SHOULDER PAIN: ICD-10-CM

## 2022-04-04 DIAGNOSIS — M25.511 CHRONIC RIGHT SHOULDER PAIN: ICD-10-CM

## 2022-04-04 PROCEDURE — 97010 HOT OR COLD PACKS THERAPY: CPT | Mod: GP | Performed by: PHYSICAL THERAPIST

## 2022-04-04 PROCEDURE — 97110 THERAPEUTIC EXERCISES: CPT | Mod: GP | Performed by: PHYSICAL THERAPIST

## 2022-04-04 NOTE — PROGRESS NOTES
Subjective:  HPI  Physical Exam                    Objective:  System    Physical Exam    General     ROS    Assessment/Plan:    PROGRESS  REPORT    Progress reporting period is from 2/18/22 to 4/4/22.       SUBJECTIVE  Subjective changes noted by patient: Art reports his shoulder has felt quite a bit better in general since reducing his PT exercises to be mostly without weight.  He still has pain with reaching or lifting away from his body or overhead.  He notes pain with repetitive things like raking.  He is considering an injection vs acupuncture as he continues his exercises.    Current pain level is 1/10.     Previous pain level was  2/10.   Changes in function:  Yes, see above.  Adverse reaction to treatment or activity: None    OBJECTIVE  Changes noted in objective findings:  Yes  Right shoulder AROM:    flexion 150 with mid range pain     abduction 150 with mid range pain     ER 45 with substitution.     IR/EXT to T12.      Right shoulder strength:    flexion 4+/5 with pain    scaption 4+/5 with pain     ER 3+/5     IR 5-/5       ASSESSMENT/PLAN  Updated problem list and treatment plan: Diagnosis 1:  Right shoulder pain    Pain -  hot/cold therapy, manual therapy, self management, education and home program  Decreased ROM/flexibility - manual therapy, therapeutic exercise and home program  Decreased strength - therapeutic exercise, therapeutic activities and home program  Decreased proprioception - neuro re-education, therapeutic activities and home program  Decreased function - therapeutic activities and home program  STG/LTGs have been met or progress has been made towards goals:  Yes, but 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  Assessment of Progress: The patient's condition has potential to improve.  The patient's progress has plateaued.  Self Management Plans:  Patient has been instructed in a home treatment program.  Patient  has been instructed in self management of  symptoms.    Magdaleno continues to require the following intervention to meet STG and LTG's:  PT    Recommendations:  This patient would benefit from continued therapy.     Frequency:  1 X a month, once daily  Duration:  for 3 months      This patient would benefit from further evaluation to determine if further imaging or pain relieving interventions such as injection would be beneficial to allow for progression of PT without increasing pain..    Please refer to the daily flowsheet for treatment today, total treatment time and time spent performing 1:1 timed codes.

## 2022-04-04 NOTE — LETTER
[facility]  [address]  [place] [address]  [phone]    2022    Re: Magdaleno Dutton   :   1950  MRN:  [id number]   REFERRING PHYSICIAN:   Katerine VANCE [facility]    Date of Initial Evaluation:  22  Visits:  Rxs Used: 12  Reason for Referral:  Chronic right shoulder pain    PROGRESS  REPORT  Progress reporting period is from 22 to 22.       SUBJECTIVE  Subjective changes noted by patient: Art reports his shoulder has felt quite a bit better in general since reducing his PT exercises to be mostly without weight.  He still has pain with reaching or lifting away from his body or overhead.  He notes pain with repetitive things like raking.  He is considering an injection vs acupuncture as he continues his exercises.    Current pain level is 1/10.     Previous pain level was  2/10.   Changes in function:  Yes, see above.  Adverse reaction to treatment or activity: None    OBJECTIVE  Changes noted in objective findings:  Yes  Right shoulder AROM:    flexion 150 with mid range pain     abduction 150 with mid range pain     ER 45 with substitution.     IR/EXT to T12.      Right shoulder strength:    flexion 4+/5 with pain    scaption 4+/5 with pain     ER 3+/5     IR 5-/5       ASSESSMENT/PLAN  Updated problem list and treatment plan: Diagnosis 1:  Right shoulder pain    Pain -  hot/cold therapy, manual therapy, self management, education and home program  Decreased ROM/flexibility - manual therapy, therapeutic exercise and home program  Decreased strength - therapeutic exercise, therapeutic activities and home program  Decreased proprioception - neuro re-education, therapeutic activities and home program  Decreased function - therapeutic activities and home program  STG/LTGs have been met or progress has been made towards goals:  Yes, but 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  Assessment of Progress: The patient's condition has potential to improve.  The patient's progress has plateaued.  Self Management Plans:  Patient has been instructed in a home treatment program.  Patient  has been instructed in self management of symptoms.    Magdaleno continues to require the following intervention to meet STG and LTG's:  PT    Recommendations:  This patient would benefit from continued therapy.     Frequency:  1 X a month, once daily  Duration:  for 3 months    This patient would benefit from further evaluation to determine if further imaging or pain relieving interventions such as injection would be beneficial to allow for progression of PT without increasing pain..    Thank you for your referral.    INQUIRIES  Therapist: Leonel Bar, PT, DPT, SCS  [facility] [place] [facility]  [address]  [place] [address]  Phone: [phone]  Fax: [phone]

## 2022-10-25 ENCOUNTER — IMMUNIZATION (OUTPATIENT)
Dept: FAMILY MEDICINE | Facility: CLINIC | Age: 72
End: 2022-10-25
Payer: COMMERCIAL

## 2022-10-25 PROCEDURE — G0008 ADMIN INFLUENZA VIRUS VAC: HCPCS

## 2022-10-25 PROCEDURE — 90662 IIV NO PRSV INCREASED AG IM: CPT

## 2022-11-22 PROBLEM — G89.29 CHRONIC RIGHT SHOULDER PAIN: Status: RESOLVED | Noted: 2021-10-06 | Resolved: 2022-11-22

## 2022-11-22 PROBLEM — M25.511 CHRONIC RIGHT SHOULDER PAIN: Status: RESOLVED | Noted: 2021-10-06 | Resolved: 2022-11-22

## 2022-11-22 NOTE — PROGRESS NOTES
Patient is discharged from PT.  Please refer to progress note dated 4/4/22 for last known functional status as well as final objective/subjective values.

## 2022-12-29 ENCOUNTER — IMMUNIZATION (OUTPATIENT)
Dept: FAMILY MEDICINE | Facility: CLINIC | Age: 72
End: 2022-12-29
Payer: COMMERCIAL

## 2022-12-29 PROCEDURE — 0124A COVID-19 VACCINE BIVALENT BOOSTER 12+ (PFIZER): CPT

## 2022-12-29 PROCEDURE — 91312 COVID-19 VACCINE BIVALENT BOOSTER 12+ (PFIZER): CPT

## 2023-04-12 ENCOUNTER — OFFICE VISIT (OUTPATIENT)
Dept: FAMILY MEDICINE | Facility: CLINIC | Age: 73
End: 2023-04-12
Payer: COMMERCIAL

## 2023-04-12 VITALS
HEIGHT: 65 IN | BODY MASS INDEX: 31.02 KG/M2 | HEART RATE: 67 BPM | SYSTOLIC BLOOD PRESSURE: 128 MMHG | RESPIRATION RATE: 18 BRPM | OXYGEN SATURATION: 97 % | DIASTOLIC BLOOD PRESSURE: 77 MMHG | WEIGHT: 186.2 LBS | TEMPERATURE: 97.3 F

## 2023-04-12 DIAGNOSIS — F10.20 UNCOMPLICATED ALCOHOL DEPENDENCE (H): ICD-10-CM

## 2023-04-12 DIAGNOSIS — Z00.00 ENCOUNTER FOR MEDICARE ANNUAL WELLNESS EXAM: Primary | ICD-10-CM

## 2023-04-12 DIAGNOSIS — R41.3 MEMORY LOSS: ICD-10-CM

## 2023-04-12 DIAGNOSIS — J30.2 SEASONAL ALLERGIC RHINITIS, UNSPECIFIED TRIGGER: ICD-10-CM

## 2023-04-12 DIAGNOSIS — I10 HYPERTENSION GOAL BP (BLOOD PRESSURE) < 140/90: ICD-10-CM

## 2023-04-12 DIAGNOSIS — Z12.11 SCREEN FOR COLON CANCER: ICD-10-CM

## 2023-04-12 DIAGNOSIS — E78.5 HYPERLIPIDEMIA LDL GOAL <160: ICD-10-CM

## 2023-04-12 DIAGNOSIS — D17.30 LIPOMA OF SKIN AND SUBCUTANEOUS TISSUE: ICD-10-CM

## 2023-04-12 DIAGNOSIS — Z23 NEED FOR SHINGLES VACCINE: ICD-10-CM

## 2023-04-12 LAB
ANION GAP SERPL CALCULATED.3IONS-SCNC: 8 MMOL/L (ref 7–15)
BUN SERPL-MCNC: 16.6 MG/DL (ref 8–23)
CALCIUM SERPL-MCNC: 9.7 MG/DL (ref 8.8–10.2)
CHLORIDE SERPL-SCNC: 108 MMOL/L (ref 98–107)
CHOLEST SERPL-MCNC: 183 MG/DL
CREAT SERPL-MCNC: 0.74 MG/DL (ref 0.67–1.17)
DEPRECATED HCO3 PLAS-SCNC: 25 MMOL/L (ref 22–29)
ERYTHROCYTE [DISTWIDTH] IN BLOOD BY AUTOMATED COUNT: 12.8 % (ref 10–15)
GFR SERPL CREATININE-BSD FRML MDRD: >90 ML/MIN/1.73M2
GLUCOSE SERPL-MCNC: 102 MG/DL (ref 70–99)
HCT VFR BLD AUTO: 43.8 % (ref 40–53)
HDLC SERPL-MCNC: 62 MG/DL
HGB BLD-MCNC: 14.5 G/DL (ref 13.3–17.7)
LDLC SERPL CALC-MCNC: 99 MG/DL
MCH RBC QN AUTO: 30.5 PG (ref 26.5–33)
MCHC RBC AUTO-ENTMCNC: 33.1 G/DL (ref 31.5–36.5)
MCV RBC AUTO: 92 FL (ref 78–100)
NONHDLC SERPL-MCNC: 121 MG/DL
PLATELET # BLD AUTO: 204 10E3/UL (ref 150–450)
POTASSIUM SERPL-SCNC: 4.6 MMOL/L (ref 3.4–5.3)
RBC # BLD AUTO: 4.75 10E6/UL (ref 4.4–5.9)
SODIUM SERPL-SCNC: 141 MMOL/L (ref 136–145)
TRIGL SERPL-MCNC: 111 MG/DL
TSH SERPL DL<=0.005 MIU/L-ACNC: 1.21 UIU/ML (ref 0.3–4.2)
VIT B12 SERPL-MCNC: 326 PG/ML (ref 232–1245)
WBC # BLD AUTO: 5.2 10E3/UL (ref 4–11)

## 2023-04-12 PROCEDURE — 80048 BASIC METABOLIC PNL TOTAL CA: CPT | Performed by: NURSE PRACTITIONER

## 2023-04-12 PROCEDURE — 80061 LIPID PANEL: CPT | Performed by: NURSE PRACTITIONER

## 2023-04-12 PROCEDURE — G0439 PPPS, SUBSEQ VISIT: HCPCS | Performed by: NURSE PRACTITIONER

## 2023-04-12 PROCEDURE — 36415 COLL VENOUS BLD VENIPUNCTURE: CPT | Performed by: NURSE PRACTITIONER

## 2023-04-12 PROCEDURE — 84443 ASSAY THYROID STIM HORMONE: CPT | Performed by: NURSE PRACTITIONER

## 2023-04-12 PROCEDURE — 82607 VITAMIN B-12: CPT | Performed by: NURSE PRACTITIONER

## 2023-04-12 PROCEDURE — 85027 COMPLETE CBC AUTOMATED: CPT | Performed by: NURSE PRACTITIONER

## 2023-04-12 PROCEDURE — 99214 OFFICE O/P EST MOD 30 MIN: CPT | Mod: 25 | Performed by: NURSE PRACTITIONER

## 2023-04-12 RX ORDER — LISINOPRIL 20 MG/1
20 TABLET ORAL DAILY
Qty: 90 TABLET | Refills: 3 | Status: SHIPPED | OUTPATIENT
Start: 2023-04-12 | End: 2024-03-27

## 2023-04-12 ASSESSMENT — ENCOUNTER SYMPTOMS
HEMATURIA: 0
MYALGIAS: 1
DYSURIA: 0
HEARTBURN: 0
CHILLS: 0
WEAKNESS: 0
JOINT SWELLING: 0
HEMATOCHEZIA: 0
PALPITATIONS: 0
EYE PAIN: 0
DIZZINESS: 0
FREQUENCY: 0
SORE THROAT: 0
ABDOMINAL PAIN: 0
SHORTNESS OF BREATH: 0
ARTHRALGIAS: 1
COUGH: 0
CONSTIPATION: 0
NERVOUS/ANXIOUS: 0
DIARRHEA: 0
FEVER: 0
PARESTHESIAS: 0
NAUSEA: 0
HEADACHES: 1

## 2023-04-12 ASSESSMENT — ACTIVITIES OF DAILY LIVING (ADL): CURRENT_FUNCTION: NO ASSISTANCE NEEDED

## 2023-04-12 ASSESSMENT — PAIN SCALES - GENERAL: PAINLEVEL: MILD PAIN (2)

## 2023-04-12 NOTE — PATIENT INSTRUCTIONS
(I10) Hypertension goal BP (blood pressure) < 140/90  Comment: Chronic, stable.  Blood pressure below target goal in office today.  Tolerating lisinopril without any side effects.  Continue without any changes, recheck metabolic panel.  Plan: lisinopril (ZESTRIL) 20 MG tablet, Basic         metabolic panel  (Ca, Cl, CO2, Creat, Gluc, K,         Na, BUN)          (F10.20) Uncomplicated alcohol dependence (H)  Comment: Continues to drink 3-4 beers daily.  Will check CBC.  Encouraged to decrease intake if able.  Plan: CBC with platelets          (E78.5) Hyperlipidemia LDL goal <160  Comment: Due for cholesterol levels, however stable 5 years ago.  Patient is fasting.  Plan: Lipid panel reflex to direct LDL Non-fasting          (D17.30) Lipoma of skin and subcutaneous tissue  Comment: Lipoma of left lower outer thigh.  Discussed benign nature.  Education provided.  Can follow-up if becomes bothersome.    (R41.3) Memory loss  Comment: Having intermittent memory loss in the last several months.  We will check labs, encouraged to continue staying active and decrease alcohol intake as above.  Plan: Vitamin B12, TSH with free T4 reflex          (J30.2) Seasonal allergic rhinitis, unspecified trigger  Comment: Intermittent, chronic.  Getting mild headaches occasionally.  Recommend patient start an over-the-counter antihistamine such as Zyrtec or Claritin daily.    (Z23) Need for shingles vaccine  Comment: Due for shingles vaccine, discussed and patient would like to proceed.  Advised to stop and at the pharmacy to receive.    (Z12.11) Screen for colon cancer  Comment: Due for colon cancer screening, FIT test will be sent home with patient.  Patient to bring test back to clinic when completed.  Plan: Fecal colorectal cancer screen (FIT)      Patient Education   Personalized Prevention Plan  You are due for the preventive services outlined below.  Your care team is available to assist you in scheduling these services.  If you  have already completed any of these items, please share that information with your care team to update in your medical record.  Health Maintenance Due   Topic Date Due    Colorectal Cancer Screening  Never done    Zoster (Shingles) Vaccine (1 of 2) Never done    Discuss Advance Care Planning  12/19/2021    Cholesterol Lab  12/28/2022    ANNUAL REVIEW OF HM ORDERS  03/25/2023

## 2023-04-12 NOTE — PROGRESS NOTES
"SUBJECTIVE:   Magdaleno is a 73 year old who presents for Preventive Visit.      4/12/2023     7:28 AM   Additional Questions   Roomed by Jolanta     Patient has been advised of split billing requirements and indicates understanding: Yes  Are you in the first 12 months of your Medicare coverage?  No    Healthy Habits:     In general, how would you rate your overall health?  Good    Frequency of exercise:  None    Do you usually eat at least 4 servings of fruit and vegetables a day, include whole grains    & fiber and avoid regularly eating high fat or \"junk\" foods?  No    Taking medications regularly:  Yes    Medication side effects:  None    Ability to successfully perform activities of daily living:  No assistance needed    Home Safety:  No safety concerns identified    Hearing Impairment:  No hearing concerns    In the past 6 months, have you been bothered by leaking of urine?  No    In general, how would you rate your overall mental or emotional health?  Good      PHQ-2 Total Score: 0    Additional concerns today:  No      Have you ever done Advance Care Planning? (For example, a Health Directive, POLST, or a discussion with a medical provider or your loved ones about your wishes): No, advance care planning information given to patient to review.  Patient plans to discuss their wishes with loved ones or provider.        Fall risk  Fallen 2 or more times in the past year?: No  Any fall with injury in the past year?: No    Cognitive Screening   1) Repeat 3 items (Leader, Season, Table)    2) Clock draw: NORMAL  3) 3 item recall: Recalls 3 objects  Results: NORMAL clock, 1-2 items recalled: COGNITIVE IMPAIRMENT LESS LIKELY    Mini-CogTM Dolores Potts. Licensed by the author for use in [facility]; reprinted with permission (fracisco@.[facility]). All rights reserved.      Do you have sleep apnea, excessive snoring or daytime drowsiness?: no    Headaches  Mild headache's every so often - maybe monthly, maybe for a " few days  Does have some allergy symptoms     Reviewed and updated as needed this visit by clinical staff    Allergies  Meds  Problems             Reviewed and updated as needed this visit by Provider                 Social History     Tobacco Use     Smoking status: Former     Types: Cigarettes     Quit date: 2001     Years since quittin.2     Passive exposure: Never     Smokeless tobacco: Never   Vaping Use     Vaping status: Never Used   Substance Use Topics     Alcohol use: Yes     Alcohol/week: 28.0 standard drinks of alcohol     Types: 28 Cans of beer per week     Comment: 3-5 beers at night              2023     7:41 AM   Alcohol Use   Prescreen: >3 drinks/day or >7 drinks/week? Yes   AUDIT SCORE  6         2023     7:41 AM   AUDIT - Alcohol Use Disorders Identification Test - Reproduced from the World Health Organization Audit 2001 (Second Edition)   1.  How often do you have a drink containing alcohol? 2 to 3 times a week   2.  How many drinks containing alcohol do you have on a typical day when you are drinking? 3 or 4   3.  How often do you have five or more drinks on one occasion? Less than monthly   4.  How often during the last year have you found that you were not able to stop drinking once you had started? Never   5.  How often during the last year have you failed to do what was normally expected of you because of drinking? Less than monthly   6.  How often during the last year have you needed a first drink in the morning to get yourself going after a heavy drinking session? Never   7.  How often during the last year have you had a feeling of guilt or remorse after drinking? Never   8.  How often during the last year have you been unable to remember what happened the night before because of your drinking? Never   9.  Have you or someone else been injured because of your drinking? No   10. Has a relative, friend, doctor or other health care worker been concerned about your  drinking or suggested you cut down? No   TOTAL SCORE 6     Do you have a current opioid prescription? No  Do you use any other controlled substances or medications that are not prescribed by a provider? Alcohol      Lump on left lower outer thigh    Hypertension Follow-up      Do you check your blood pressure regularly outside of the clinic? No     Are you following a low salt diet? No    Are your blood pressures ever more than 140 on the top number (systolic) OR more   than 90 on the bottom number (diastolic), for example 140/90? N/A       Current providers sharing in care for this patient include:   Patient Care Team:  Anju Schofield APRN CNP as PCP - General (Nurse Practitioner - Family)  Anju Schofield APRN CNP as Assigned PCP    The following health maintenance items are reviewed in Epic and correct as of today:  Health Maintenance   Topic Date Due     COLORECTAL CANCER SCREENING  Never done     ZOSTER IMMUNIZATION (1 of 2) Never done     LIPID  12/28/2022     ANNUAL REVIEW OF HM ORDERS  03/25/2023     MEDICARE ANNUAL WELLNESS VISIT  04/12/2024     FALL RISK ASSESSMENT  04/12/2024     DTAP/TDAP/TD IMMUNIZATION (2 - Td or Tdap) 12/19/2026     ADVANCE CARE PLANNING  04/12/2028     HEPATITIS C SCREENING  Completed     PHQ-2 (once per calendar year)  Completed     INFLUENZA VACCINE  Completed     Pneumococcal Vaccine: 65+ Years  Completed     AORTIC ANEURYSM SCREENING (SYSTEM ASSIGNED)  Completed     COVID-19 Vaccine  Completed     IPV IMMUNIZATION  Aged Out     MENINGITIS IMMUNIZATION  Aged Out           Review of Systems   Constitutional: Negative for chills and fever.   HENT: Positive for hearing loss. Negative for congestion, ear pain and sore throat.    Eyes: Negative for pain and visual disturbance.   Respiratory: Negative for cough and shortness of breath.    Cardiovascular: Negative for chest pain, palpitations and peripheral edema.   Gastrointestinal: Negative for abdominal pain,  "constipation, diarrhea, heartburn, hematochezia and nausea.   Genitourinary: Positive for impotence. Negative for dysuria, frequency, genital sores, hematuria, penile discharge and urgency.   Musculoskeletal: Positive for arthralgias and myalgias. Negative for joint swelling.   Skin: Negative for rash.   Neurological: Positive for headaches. Negative for dizziness, weakness and paresthesias.   Psychiatric/Behavioral: Negative for mood changes. The patient is not nervous/anxious.        OBJECTIVE:   Temp 97.3  F (36.3  C)   Resp 18   Ht 1.651 m (5' 5\")   Wt 84.5 kg (186 lb 3.2 oz)   SpO2 97%   BMI 30.99 kg/m   Estimated body mass index is 30.99 kg/m  as calculated from the following:    Height as of this encounter: 1.651 m (5' 5\").    Weight as of this encounter: 84.5 kg (186 lb 3.2 oz).  Physical Exam  GENERAL: healthy, alert and no distress  EYES: Eyes grossly normal to inspection, PERRL and conjunctivae and sclerae normal  HENT: ear canals and TM's normal, nose and mouth without ulcers or lesions  NECK: no adenopathy, no asymmetry, masses, or scars and thyroid normal to palpation  RESP: lungs clear to auscultation - no rales, rhonchi or wheezes  CV: regular rate and rhythm, normal S1 S2, no S3 or S4, no murmur, click or rub, no peripheral edema and peripheral pulses strong  ABDOMEN: soft, nontender, no hepatosplenomegaly, no masses and bowel sounds normal  MS: no gross musculoskeletal defects noted, no edema  SKIN: no suspicious rashes, an approximate 1 cm x 1cm soft lump on left lower, outer thigh - consistent with lipoma  NEURO: Normal strength and tone, mentation intact and speech normal  PSYCH: mentation appears normal, affect normal/bright    Diagnostic Test Results:  Labs reviewed in Epic  Pending     ASSESSMENT / PLAN:   (Z00.00) Encounter for Medicare annual wellness exam  (primary encounter diagnosis)  Comment: Relatively healthy 73-year-old male in for annual Medicare wellness examination.  Chronic " conditions as below.  Follow-up in 1 year.  Plan: PRIMARY CARE FOLLOW-UP SCHEDULING, CBC with         platelets          (I10) Hypertension goal BP (blood pressure) < 140/90  Comment: Chronic, stable.  Blood pressure below target goal in office today.  Tolerating lisinopril without any side effects.  Continue without any changes, recheck metabolic panel.  Plan: lisinopril (ZESTRIL) 20 MG tablet, Basic         metabolic panel  (Ca, Cl, CO2, Creat, Gluc, K,         Na, BUN)          (F10.20) Uncomplicated alcohol dependence (H)  Comment: Continues to drink 3-4 beers daily.  Will check CBC.  Encouraged to decrease intake if able.  Plan: CBC with platelets          (E78.5) Hyperlipidemia LDL goal <160  Comment: Due for cholesterol levels, however stable 5 years ago.  Patient is fasting.  Plan: Lipid panel reflex to direct LDL Non-fasting          (D17.30) Lipoma of skin and subcutaneous tissue  Comment: Lipoma of left lower outer thigh.  Discussed benign nature.  Education provided.  Can follow-up if becomes bothersome.    (R41.3) Memory loss  Comment: Having intermittent memory loss in the last several months.  We will check labs, encouraged to continue staying active and decrease alcohol intake as above.  Plan: Vitamin B12, TSH with free T4 reflex          (J30.2) Seasonal allergic rhinitis, unspecified trigger  Comment: Intermittent, chronic.  Getting mild headaches occasionally.  Recommend patient start an over-the-counter antihistamine such as Zyrtec or Claritin daily.    (Z23) Need for shingles vaccine  Comment: Due for shingles vaccine, discussed and patient would like to proceed.  Advised to stop and at the pharmacy to receive.    (Z12.11) Screen for colon cancer  Comment: Due for colon cancer screening, FIT test will be sent home with patient.  Patient to bring test back to clinic when completed.  Plan: Fecal colorectal cancer screen (FIT)            Patient has been advised of split billing requirements and  "indicates understanding: Yes      COUNSELING:  Reviewed preventive health counseling, as reflected in patient instructions      BMI:   Estimated body mass index is 30.99 kg/m  as calculated from the following:    Height as of this encounter: 1.651 m (5' 5\").    Weight as of this encounter: 84.5 kg (186 lb 3.2 oz).   Weight management plan: Discussed healthy diet and exercise guidelines      He reports that he quit smoking about 22 years ago. His smoking use included cigarettes. He has never been exposed to tobacco smoke. He has never used smokeless tobacco.      Appropriate preventive services were discussed with this patient, including applicable screening as appropriate for cardiovascular disease, diabetes, osteopenia/osteoporosis, and glaucoma.  As appropriate for age/gender, discussed screening for colorectal cancer, prostate cancer, breast cancer, and cervical cancer. Checklist reviewing preventive services available has been given to the patient.    Reviewed patients plan of care and provided an AVS. The Basic Care Plan (routine screening as documented in Health Maintenance) for Magdaleno meets the Care Plan requirement. This Care Plan has been established and reviewed with the Patient.          Anju Schofield DNP, APRN-CNP   [facility]    Identified Health Risks:    I have reviewed Opioid Use Disorder and Substance Use Disorder risk factors and made any needed referrals.     Chart documentation with Dragon Voice recognition Software. Although reviewed after completion, some words and grammatical errors may remain.   "

## 2023-04-12 NOTE — LETTER
April 25, 2023      Magdaleno Ortizter  [address] [facility] [address]        Dear ,    We are writing to inform you of your test results.    Complete blood count normal   Normal thyroid hormone and vitamin B12 -these would not be contributing to any memory loss.  If patient continues or worsens, recommend follow-up in clinic.   Metabolic panel normal including electrolytes, glucose level and kidney functions   Cholesterol levels normal      Resulted Orders   CBC with platelets   Result Value Ref Range    WBC Count 5.2 4.0 - 11.0 10e3/uL    RBC Count 4.75 4.40 - 5.90 10e6/uL    Hemoglobin 14.5 13.3 - 17.7 g/dL    Hematocrit 43.8 40.0 - 53.0 %    MCV 92 78 - 100 fL    MCH 30.5 26.5 - 33.0 pg    MCHC 33.1 31.5 - 36.5 g/dL    RDW 12.8 10.0 - 15.0 %    Platelet Count 204 150 - 450 10e3/uL   TSH with free T4 reflex   Result Value Ref Range    TSH 1.21 0.30 - 4.20 uIU/mL   Vitamin B12   Result Value Ref Range    Vitamin B12 326 232 - 1,245 pg/mL   Basic metabolic panel  (Ca, Cl, CO2, Creat, Gluc, K, Na, BUN)   Result Value Ref Range    Sodium 141 136 - 145 mmol/L    Potassium 4.6 3.4 - 5.3 mmol/L    Chloride 108 (H) 98 - 107 mmol/L    Carbon Dioxide (CO2) 25 22 - 29 mmol/L    Anion Gap 8 7 - 15 mmol/L    Urea Nitrogen 16.6 8.0 - 23.0 mg/dL    Creatinine 0.74 0.67 - 1.17 mg/dL    Calcium 9.7 8.8 - 10.2 mg/dL    Glucose 102 (H) 70 - 99 mg/dL    GFR Estimate >90 >60 mL/min/1.73m2      Comment:      eGFR calculated using 2021 CKD-EPI equation.   Lipid panel reflex to direct LDL Non-fasting   Result Value Ref Range    Cholesterol 183 <200 mg/dL    Triglycerides 111 <150 mg/dL    Direct Measure HDL 62 >=40 mg/dL    LDL Cholesterol Calculated 99 <=100 mg/dL    Non HDL Cholesterol 121 <130 mg/dL    Narrative    Cholesterol  Desirable:  <200 mg/dL    Triglycerides  Normal:  Less than 150 mg/dL  Borderline High:  150-199 mg/dL  High:  200-499 mg/dL  Very High:  Greater than or equal to 500 mg/dL    Direct Measure  HDL  Female:  Greater than or equal to 50 mg/dL   Male:  Greater than or equal to 40 mg/dL    LDL Cholesterol  Desirable:  <100mg/dL  Above Desirable:  100-129 mg/dL   Borderline High:  130-159 mg/dL   High:  160-189 mg/dL   Very High:  >= 190 mg/dL    Non HDL Cholesterol  Desirable:  130 mg/dL  Above Desirable:  130-159 mg/dL  Borderline High:  160-189 mg/dL  High:  190-219 mg/dL  Very High:  Greater than or equal to 220 mg/dL       If you have any questions or concerns, please call the clinic at the number listed above.       Sincerely,      MADDIE Fraire CNP

## 2023-10-23 ENCOUNTER — IMMUNIZATION (OUTPATIENT)
Dept: FAMILY MEDICINE | Facility: CLINIC | Age: 73
End: 2023-10-23
Payer: COMMERCIAL

## 2023-10-23 DIAGNOSIS — Z23 NEED FOR PROPHYLACTIC VACCINATION AND INOCULATION AGAINST INFLUENZA: Primary | ICD-10-CM

## 2023-10-23 PROCEDURE — 90662 IIV NO PRSV INCREASED AG IM: CPT

## 2023-10-23 PROCEDURE — G0008 ADMIN INFLUENZA VIRUS VAC: HCPCS

## 2024-03-27 DIAGNOSIS — I10 HYPERTENSION GOAL BP (BLOOD PRESSURE) < 140/90: ICD-10-CM

## 2024-03-27 RX ORDER — LISINOPRIL 20 MG/1
20 TABLET ORAL DAILY
Qty: 90 TABLET | Refills: 0 | Status: SHIPPED | OUTPATIENT
Start: 2024-03-27 | End: 2024-04-22

## 2024-04-22 ENCOUNTER — OFFICE VISIT (OUTPATIENT)
Dept: FAMILY MEDICINE | Facility: CLINIC | Age: 74
End: 2024-04-22
Payer: COMMERCIAL

## 2024-04-22 VITALS
TEMPERATURE: 96.5 F | DIASTOLIC BLOOD PRESSURE: 76 MMHG | WEIGHT: 192.2 LBS | OXYGEN SATURATION: 96 % | BODY MASS INDEX: 32.02 KG/M2 | RESPIRATION RATE: 22 BRPM | HEIGHT: 65 IN | SYSTOLIC BLOOD PRESSURE: 112 MMHG | HEART RATE: 63 BPM

## 2024-04-22 DIAGNOSIS — Z12.5 SCREENING FOR PROSTATE CANCER: ICD-10-CM

## 2024-04-22 DIAGNOSIS — R35.0 URINARY FREQUENCY: ICD-10-CM

## 2024-04-22 DIAGNOSIS — I10 HYPERTENSION GOAL BP (BLOOD PRESSURE) < 140/90: ICD-10-CM

## 2024-04-22 DIAGNOSIS — R73.01 ELEVATED FASTING GLUCOSE: ICD-10-CM

## 2024-04-22 DIAGNOSIS — J00 ACUTE RHINITIS: ICD-10-CM

## 2024-04-22 DIAGNOSIS — E78.5 HYPERLIPIDEMIA LDL GOAL <160: ICD-10-CM

## 2024-04-22 DIAGNOSIS — F10.20 UNCOMPLICATED ALCOHOL DEPENDENCE (H): ICD-10-CM

## 2024-04-22 DIAGNOSIS — Z00.00 ENCOUNTER FOR MEDICARE ANNUAL WELLNESS EXAM: Primary | ICD-10-CM

## 2024-04-22 DIAGNOSIS — Z12.11 SCREEN FOR COLON CANCER: ICD-10-CM

## 2024-04-22 LAB
ANION GAP SERPL CALCULATED.3IONS-SCNC: 11 MMOL/L (ref 7–15)
BUN SERPL-MCNC: 15.9 MG/DL (ref 8–23)
CALCIUM SERPL-MCNC: 9.9 MG/DL (ref 8.8–10.2)
CHLORIDE SERPL-SCNC: 106 MMOL/L (ref 98–107)
CHOLEST SERPL-MCNC: 200 MG/DL
CREAT SERPL-MCNC: 0.81 MG/DL (ref 0.67–1.17)
DEPRECATED HCO3 PLAS-SCNC: 23 MMOL/L (ref 22–29)
EGFRCR SERPLBLD CKD-EPI 2021: >90 ML/MIN/1.73M2
ERYTHROCYTE [DISTWIDTH] IN BLOOD BY AUTOMATED COUNT: 13 % (ref 10–15)
FASTING STATUS PATIENT QL REPORTED: YES
GLUCOSE SERPL-MCNC: 103 MG/DL (ref 70–99)
HBA1C MFR BLD: 5.6 % (ref 0–5.6)
HCT VFR BLD AUTO: 45 % (ref 40–53)
HDLC SERPL-MCNC: 56 MG/DL
HGB BLD-MCNC: 14.7 G/DL (ref 13.3–17.7)
LDLC SERPL CALC-MCNC: 121 MG/DL
MCH RBC QN AUTO: 29.9 PG (ref 26.5–33)
MCHC RBC AUTO-ENTMCNC: 32.7 G/DL (ref 31.5–36.5)
MCV RBC AUTO: 92 FL (ref 78–100)
NONHDLC SERPL-MCNC: 144 MG/DL
PLATELET # BLD AUTO: 200 10E3/UL (ref 150–450)
POTASSIUM SERPL-SCNC: 4.6 MMOL/L (ref 3.4–5.3)
PSA SERPL DL<=0.01 NG/ML-MCNC: 2.91 NG/ML (ref 0–6.5)
RBC # BLD AUTO: 4.91 10E6/UL (ref 4.4–5.9)
SODIUM SERPL-SCNC: 140 MMOL/L (ref 135–145)
TRIGL SERPL-MCNC: 117 MG/DL
WBC # BLD AUTO: 5.4 10E3/UL (ref 4–11)

## 2024-04-22 PROCEDURE — G0103 PSA SCREENING: HCPCS | Performed by: NURSE PRACTITIONER

## 2024-04-22 PROCEDURE — 83036 HEMOGLOBIN GLYCOSYLATED A1C: CPT | Performed by: NURSE PRACTITIONER

## 2024-04-22 PROCEDURE — 99214 OFFICE O/P EST MOD 30 MIN: CPT | Mod: 25 | Performed by: NURSE PRACTITIONER

## 2024-04-22 PROCEDURE — 36415 COLL VENOUS BLD VENIPUNCTURE: CPT | Performed by: NURSE PRACTITIONER

## 2024-04-22 PROCEDURE — G0439 PPPS, SUBSEQ VISIT: HCPCS | Performed by: NURSE PRACTITIONER

## 2024-04-22 PROCEDURE — 80061 LIPID PANEL: CPT | Performed by: NURSE PRACTITIONER

## 2024-04-22 PROCEDURE — 85027 COMPLETE CBC AUTOMATED: CPT | Performed by: NURSE PRACTITIONER

## 2024-04-22 PROCEDURE — 80048 BASIC METABOLIC PNL TOTAL CA: CPT | Performed by: NURSE PRACTITIONER

## 2024-04-22 RX ORDER — RESPIRATORY SYNCYTIAL VIRUS VACCINE 120MCG/0.5
0.5 KIT INTRAMUSCULAR ONCE
Qty: 1 EACH | Refills: 0 | Status: CANCELLED | OUTPATIENT
Start: 2024-04-22 | End: 2024-04-22

## 2024-04-22 RX ORDER — LISINOPRIL 20 MG/1
20 TABLET ORAL DAILY
Qty: 90 TABLET | Refills: 3 | Status: SHIPPED | OUTPATIENT
Start: 2024-04-22

## 2024-04-22 SDOH — HEALTH STABILITY: PHYSICAL HEALTH: ON AVERAGE, HOW MANY DAYS PER WEEK DO YOU ENGAGE IN MODERATE TO STRENUOUS EXERCISE (LIKE A BRISK WALK)?: 7 DAYS

## 2024-04-22 SDOH — HEALTH STABILITY: PHYSICAL HEALTH: ON AVERAGE, HOW MANY MINUTES DO YOU ENGAGE IN EXERCISE AT THIS LEVEL?: 30 MIN

## 2024-04-22 ASSESSMENT — SOCIAL DETERMINANTS OF HEALTH (SDOH): HOW OFTEN DO YOU GET TOGETHER WITH FRIENDS OR RELATIVES?: ONCE A WEEK

## 2024-04-22 ASSESSMENT — PAIN SCALES - GENERAL: PAINLEVEL: MILD PAIN (3)

## 2024-04-22 NOTE — PATIENT INSTRUCTIONS
Hypertension goal BP (blood pressure) < 140/90  Chronic, stable blood pressure in office today. Continue medications without any changes.   - Basic metabolic panel  (Ca, Cl, CO2, Creat, Gluc, K, Na, BUN); Future  - lisinopril (ZESTRIL) 20 MG tablet; Take 1 tablet (20 mg) by mouth daily    Uncomplicated alcohol dependence (H)  Chronic, still drinking approximately 3-5 beers/day. No more than previous. Continue to encourage decreasing intake if able.   - CBC with platelets; Future    Elevated fasting glucose  Elevated fasting glucose last year, will check A1c.   - Hemoglobin A1c; Future    Urinary frequency  Screening for prostate cancer  Patient having increased urinary frequency over the last year, no pain or blood noted. No family history of prostate cancer, will check PSA.   - PSA, screen; Future    Acute rhinitis  Acute symptoms with headache's recently. Fluid noted in bilateral ears. Recommend starting an over the counter anti-histamine daily such as Claritin, Zyrtec or Allegra. If symptoms not improved in approximately 2 weeks, start over the counter Flonase nasal spray. 2 sprays each nostril daily. Point out towards eyes when administering. Continue for at least 3-4 weeks. If symptoms not improved see provider.     Hyperlipidemia LDL goal <160  Patient fasting today, recheck lipids.   - Lipid Profile (Chol, Trig, HDL, LDL calc); Future        Preventive Care Advice   This is general advice given by our system to help you stay healthy. However, your care team may have specific advice just for you. Please talk to your care team about your preventive care needs.  Nutrition  Eat 5 or more servings of fruits and vegetables each day.  Try wheat bread, brown rice and whole grain pasta (instead of white bread, rice, and pasta).  Get enough calcium and vitamin D. Check the label on foods and aim for 100% of the RDA (recommended daily allowance).  Lifestyle  Exercise at least 150 minutes each week   (30 minutes a day, 5  days a week).  Do muscle strengthening activities 2 days a week. These help control your weight and prevent disease.  No smoking.  Wear sunscreen to prevent skin cancer.  Have a dental exam and cleaning every 6 months.  Yearly exams  See your health care team every year to talk about:  Any changes in your health.  Any medicines your care team has prescribed.  Preventive care, family planning, and ways to prevent chronic diseases.  Shots (vaccines)   HPV shots (up to age 26), if you've never had them before.  Hepatitis B shots (up to age 59), if you've never had them before.  COVID-19 shot: Get this shot when it's due.  Flu shot: Get a flu shot every year.  Tetanus shot: Get a tetanus shot every 10 years.  Pneumococcal, hepatitis A, and RSV shots: Ask your care team if you need these based on your risk.  Shingles shot (for age 50 and up).  General health tests  Diabetes screening:  Starting at age 35, Get screened for diabetes at least every 3 years.  If you are younger than age 35, ask your care team if you should be screened for diabetes.  Cholesterol test: At age 39, start having a cholesterol test every 5 years, or more often if advised.  Bone density scan (DEXA): At age 50, ask your care team if you should have this scan for osteoporosis (brittle bones).  Hepatitis C: Get tested at least once in your life.  STIs (sexually transmitted infections)  Before age 24: Ask your care team if you should be screened for STIs.  After age 24: Get screened for STIs if you're at risk. You are at risk for STIs (including HIV) if:  You are sexually active with more than one person.  You don't use condoms every time.  You or a partner was diagnosed with a sexually transmitted infection.  If you are at risk for HIV, ask about PrEP medicine to prevent HIV.  Get tested for HIV at least once in your life, whether you are at risk for HIV or not.  Cancer screening tests  Cervical cancer screening: If you have a cervix, begin getting  regular cervical cancer screening tests at age 21. Most people who have regular screenings with normal results can stop after age 65. Talk about this with your provider.  Breast cancer scan (mammogram): If you've ever had breasts, begin having regular mammograms starting at age 40. This is a scan to check for breast cancer.  Colon cancer screening: It is important to start screening for colon cancer at age 45.  Have a colonoscopy test every 10 years (or more often if you're at risk) Or, ask your provider about stool tests like a FIT test every year or Cologuard test every 3 years.  To learn more about your testing options, visit: https://[email or web address]/611300.pdf.  For help making a decision, visit: https://bit.[employer]/mh95283.  Prostate cancer screening test: If you have a prostate and are age 55 to 69, ask your provider if you would benefit from a yearly prostate cancer screening test.  Lung cancer screening: If you are a current or former smoker age 50 to 80, ask your care team if ongoing lung cancer screenings are right for you.  For informational purposes only. Not to replace the advice of your health care provider. Copyright   2023 [employer]. All rights reserved. Clinically reviewed by the [facility]. [employer] 313169 - REV 01/24.    Hearing Loss: Care Instructions  Overview     Hearing loss is a sudden or slow decrease in how well you hear. It can range from slight to profound. Permanent hearing loss can occur with aging. It also can happen when you are exposed long-term to loud noise. Examples include listening to loud music, riding motorcycles, or being around other loud machines.  Hearing loss can affect your work and home life. It can make you feel lonely or depressed. You may feel that you have lost your independence. But hearing aids and other devices can help you hear better and feel connected to others.  Follow-up care is a key part of your treatment and safety.  Be sure to make and go to all appointments, and call your doctor if you are having problems. It's also a good idea to know your test results and keep a list of the medicines you take.  How can you care for yourself at home?  Avoid loud noises whenever possible. This helps keep your hearing from getting worse.  Always wear hearing protection around loud noises.  Wear a hearing aid as directed.  A professional can help you pick a hearing aid that will work best for you.  You can also get hearing aids over the counter for mild to moderate hearing loss.  Have hearing tests as your doctor suggests. They can show whether your hearing has changed. Your hearing aid may need to be adjusted.  Use other devices as needed. These may include:  Telephone amplifiers and hearing aids that can connect to a television, stereo, radio, or microphone.  Devices that use lights or vibrations. These alert you to the doorbell, a ringing telephone, or a baby monitor.  Television closed-captioning. This shows the words at the bottom of the screen. Most new TVs can do this.  TTY (text telephone). This lets you type messages back and forth on the telephone instead of talking or listening. These devices are also called TDD. When messages are typed on the keyboard, they are sent over the phone line to a receiving TTY. The message is shown on a monitor.  Use text messaging, social media, and email if it is hard for you to communicate by telephone.  Try to learn a listening technique called speechreading. It is not lipreading. You pay attention to people's gestures, expressions, posture, and tone of voice. These clues can help you understand what a person is saying. Face the person you are talking to, and have them face you. Make sure the lighting is good. You need to see the other person's face clearly.  Think about counseling if you need help to adjust to your hearing loss.  When should you call for help?  Watch closely for changes in your  "health, and be sure to contact your doctor if:    You think your hearing is getting worse.     You have new symptoms, such as dizziness or nausea.   Where can you learn more?  Go to https://www.[employer].net/patiented  Enter R798 in the search box to learn more about \"Hearing Loss: Care Instructions.\"  Current as of: September 27, 2023               Content Version: 14.0    4960-3591 [employer].   Care instructions adapted under license by your healthcare professional. If you have questions about a medical condition or this instruction, always ask your healthcare professional. [employer] disclaims any warranty or liability for your use of this information.      Learning About Sleeping Well  What does sleeping well mean?     Sleeping well means getting enough sleep to feel good and stay healthy. How much sleep is enough varies among people.  The number of hours you sleep and how you feel when you wake up are both important. If you do not feel refreshed, you probably need more sleep. Another sign of not getting enough sleep is feeling tired during the day.  Experts recommend that adults get at least 7 or more hours of sleep per day. Children and older adults need more sleep.  Why is getting enough sleep important?  Getting enough quality sleep is a basic part of good health. When your sleep suffers, your physical health, mood, and your thoughts can suffer too. You may find yourself feeling more grumpy or stressed. Not getting enough sleep also can lead to serious problems, including injury, accidents, anxiety, and depression.  What might cause poor sleeping?  Many things can cause sleep problems, including:  Changes to your sleep schedule.  Stress. Stress can be caused by fear about a single event, such as giving a speech. Or you may have ongoing stress, such as worry about work or school.  Depression, anxiety, and other mental or emotional conditions.  Changes in your sleep habits or " "surroundings. This includes changes that happen where you sleep, such as noise, light, or sleeping in a different bed. It also includes changes in your sleep pattern, such as having jet lag or working a late shift.  Health problems, such as pain, breathing problems, and restless legs syndrome.  Lack of regular exercise.  Using alcohol, nicotine, or caffeine before bed.  How can you help yourself?  Here are some tips that may help you sleep more soundly and wake up feeling more refreshed.  Your sleeping area   Use your bedroom only for sleeping and sex. A bit of light reading may help you fall asleep. But if it doesn't, do your reading elsewhere in the house. Try not to use your TV, computer, smartphone, or tablet while you are in bed.  Be sure your bed is big enough to stretch out comfortably, especially if you have a sleep partner.  Keep your bedroom quiet, dark, and cool. Use curtains, blinds, or a sleep mask to block out light. To block out noise, use earplugs, soothing music, or a \"white noise\" machine.  Your evening and bedtime routine   Create a relaxing bedtime routine. You might want to take a warm shower or bath, or listen to soothing music.  Go to bed at the same time every night. And get up at the same time every morning, even if you feel tired.  What to avoid   Limit caffeine (coffee, tea, caffeinated sodas) during the day, and don't have any for at least 6 hours before bedtime.  Avoid drinking alcohol before bedtime. Alcohol can cause you to wake up more often during the night.  Try not to smoke or use tobacco, especially in the evening. Nicotine can keep you awake.  Limit naps during the day, especially close to bedtime.  Avoid lying in bed awake for too long. If you can't fall asleep or if you wake up in the middle of the night and can't get back to sleep within about 20 minutes, get out of bed and go to another room until you feel sleepy.  Avoid taking medicine right before bed that may keep you " "awake or make you feel hyper or energized. Your doctor can tell you if your medicine may do this and if you can take it earlier in the day.  If you can't sleep   Imagine yourself in a peaceful, pleasant scene. Focus on the details and feelings of being in a place that is relaxing.  Get up and do a quiet or boring activity until you feel sleepy.  Avoid drinking any liquids before going to bed to help prevent waking up often to use the bathroom.  Where can you learn more?  Go to https://www."[employer]net/patiented  Enter J942 in the search box to learn more about \"Learning About Sleeping Well.\"  Current as of: July 10, 2023               Content Version: 14.0    6644-2089 [employer].   Care instructions adapted under license by your healthcare professional. If you have questions about a medical condition or this instruction, always ask your healthcare professional. [employer] disclaims any warranty or liability for your use of this information.      Bladder Training: Care Instructions  Your Care Instructions     Bladder training is used to treat urge incontinence and stress incontinence. Urge incontinence means that the need to urinate comes on so fast that you can't get to a toilet in time. Stress incontinence means that you leak urine because of pressure on your bladder. For example, it may happen when you laugh, cough, or lift something heavy.  Bladder training can increase how long you can wait before you have to urinate. It can also help your bladder hold more urine. And it can give you better control over the urge to urinate.  It is important to remember that bladder training takes a few weeks to a few months to make a difference. You may not see results right away, but don't give up.  Follow-up care is a key part of your treatment and safety. Be sure to make and go to all appointments, and call your doctor if you are having problems. It's also a good idea to know your test results " and keep a list of the medicines you take.  How can you care for yourself at home?  Work with your doctor to come up with a bladder training program that is right for you. You may use one or more of the following methods.  Delayed urination  In the beginning, try to keep from urinating for 5 minutes after you first feel the need to go.  While you wait, take deep, slow breaths to relax. Kegel exercises can also help you delay the need to go to the bathroom.  After some practice, when you can easily wait 5 minutes to urinate, try to wait 10 minutes before you urinate.  Slowly increase the waiting period until you are able to control when you have to urinate.  Scheduled urination  Empty your bladder when you first wake up in the morning.  Schedule times throughout the day when you will urinate.  Start by going to the bathroom every hour, even if you don't need to go.  Slowly increase the time between trips to the bathroom.  When you have found a schedule that works well for you, keep doing it.  If you wake up during the night and have to urinate, do it. Apply your schedule to waking hours only.  Kegel exercises  These tighten and strengthen pelvic muscles, which can help you control the flow of urine. (If doing these exercises causes pain, stop doing them and talk with your doctor.) To do Kegel exercises:  Squeeze your muscles as if you were trying not to pass gas. Or squeeze your muscles as if you were stopping the flow of urine. Your belly, legs, and buttocks shouldn't move.  Hold the squeeze for 3 seconds, then relax for 5 to 10 seconds.  Start with 3 seconds, then add 1 second each week until you are able to squeeze for 10 seconds.  Repeat the exercise 10 times a session. Do 3 to 8 sessions a day.  When should you call for help?  Watch closely for changes in your health, and be sure to contact your doctor if:    Your incontinence is getting worse.     You do not get better as expected.   Where can you learn  "more?  Go to https://www.[employer].net/patiented  Enter V684 in the search box to learn more about \"Bladder Training: Care Instructions.\"  Current as of: November 15, 2023               Content Version: 14.0    2043-1286 [employer].   Care instructions adapted under license by your healthcare professional. If you have questions about a medical condition or this instruction, always ask your healthcare professional. [employer] disclaims any warranty or liability for your use of this information.      Learning About Alcohol Use Disorder  What is alcohol use disorder?  Alcohol use disorder means that a person drinks alcohol even though it causes harm to themselves or others. It can range from mild to severe. The more symptoms of this disorder you have, the more severe it may be. People who have it may find it hard to control their use of alcohol.  People who have this disorder may argue with others about how much they're drinking. Their job may be affected because of drinking. They may drink when it's dangerous or illegal, such as when they drive. They also may have a strong need, or craving, to drink. They may feel like they must drink just to get by. Their drinking may increase their risk of getting hurt or being in a car crash.  Over time, drinking too much alcohol may cause health problems. These may include high blood pressure, liver problems, or problems with digestion.  What are the symptoms?  Maybe you've wondered about your alcohol habits or how to tell if your drinking is becoming a problem.  Here are some of the symptoms of alcohol use disorder. You may have it if you have two or more of the following symptoms:  You drink larger amounts of alcohol than you ever meant to. Or you've been drinking for a longer time than you ever meant to.  You can't cut down or control your use. Or you constantly wish you could cut down.  You spend a lot of time getting or drinking alcohol or " recovering from its effects.  You have strong cravings for alcohol.  You can no longer do your main jobs at work, at school, or at home.  You keep drinking alcohol, even though your use hurts your relationships.  You have stopped doing important activities because of your alcohol use.  You drink alcohol in situations where doing so is dangerous.  You keep drinking alcohol even though you know it's causing health problems.  You need more and more alcohol to get the same effect, or you get less effect from the same amount over time. This is called tolerance.  You have uncomfortable symptoms when you stop drinking alcohol or use less. This is called withdrawal.  Alcohol use disorder can range from mild to severe. The more symptoms you have, the more severe the disorder may be.  You might not realize that your drinking is a problem. You might not drink large amounts when you drink. Or you might go for days or weeks between drinking episodes. But even if you don't drink very often, your drinking could still be harmful and put you at risk.  How is alcohol use disorder treated?  Getting help is up to you. But you don't have to do it alone. There are many people and kinds of treatments that can help.  Treatment for alcohol use disorder can include:  Group therapy, one or more types of counseling, and alcohol education.  Medicines that help to:  Reduce withdrawal symptoms and help you safely stop drinking.  Reduce cravings for alcohol.  Support groups. These groups include Alcoholics Anonymous and [employer] (Self-Management and Recovery Training).  Some people are able to stop or cut back on drinking with help from a counselor. People who have moderate to severe alcohol use disorder may need medical treatment. They may need to stay in a hospital or treatment center.  You may have a treatment team to help you. This team may include a psychologist or psychiatrist, counselors, doctors, social workers, nurses, and a case  "manager. A  helps plan and manage your treatment.  Follow-up care is a key part of your treatment and safety. Be sure to make and go to all appointments, and call your doctor if you are having problems. It's also a good idea to know your test results and keep a list of the medicines you take.  Where can you learn more?  Go to https://www.[employer].net/patiented  Enter H758 in the search box to learn more about \"Learning About Alcohol Use Disorder.\"  Current as of: November 15, 2023               Content Version: 14.0    3172-6148 [employer].   Care instructions adapted under license by your healthcare professional. If you have questions about a medical condition or this instruction, always ask your healthcare professional. [employer] disclaims any warranty or liability for your use of this information.      "

## 2024-04-22 NOTE — LETTER
April 23, 2024      Magdaleno VANCE Nato  [address] [facility] [place] [address]        Dear ,    We are writing to inform you of your test results.    The results of your recent lipid (cholesterol) profile were abnormal.  Would advise following the recommendations as below.     Other lab results Kidney test, metabolic profile, CBC (Complete Blood Count), A1C (Hemoglobin A1C ~diabetes lab) , and PSA (prostate screening) was/were normal.     Desired or goal levels are:   CHOLESTEROL: Desirable is less than 200.   HDL (Good Cholesterol): Desirable is greater than 40 (for men) greater than 50 (for women).   LDL (Bad Cholesterol): Desirable is less than 130 (or less than 100 if you have heart disease or diabetes). Borderline 130-160.   TRIGLYCERIDES: Desirable is less than 150.  Borderline is 150-200.     As you may know, an elevated cholesterol is one factor that increases your risk for heart disease and stroke. You can improve your cholesterol by controlling the amount and type of fat you eat and by increasing your daily activity level.     Here are some ways to improve your nutrition:   Eat less fat (especially butter, Crisco and other saturated fats)   Buy lean cuts of meat, reduce your portions of red meat or substitute poultry or fish   Use skim milk and low-fat dairy products   Eat no more than 4 egg yolks per week   Avoid fried or fast foods that are high in fat   Eat more fruits and vegetables       Also consider starting or increasing your aerobic activity. Aerobic activity is the best way to improve HDL (good) cholesterol. If this would be new to you, please talk with me first about what activities are safe for you.       Please feel free to contact us with any questions or if you would like more information.     Resulted Orders   Basic metabolic panel  (Ca, Cl, CO2, Creat, Gluc, K, Na, BUN)   Result Value Ref Range    Sodium 140 135 - 145 mmol/L      Comment:      Reference intervals for this test were  updated on 09/26/2023 to more accurately reflect our healthy population. There may be differences in the flagging of prior results with similar values performed with this method. Interpretation of those prior results can be made in the context of the updated reference intervals.     Potassium 4.6 3.4 - 5.3 mmol/L    Chloride 106 98 - 107 mmol/L    Carbon Dioxide (CO2) 23 22 - 29 mmol/L    Anion Gap 11 7 - 15 mmol/L    Urea Nitrogen 15.9 8.0 - 23.0 mg/dL    Creatinine 0.81 0.67 - 1.17 mg/dL    GFR Estimate >90 >60 mL/min/1.73m2    Calcium 9.9 8.8 - 10.2 mg/dL    Glucose 103 (H) 70 - 99 mg/dL   Hemoglobin A1c   Result Value Ref Range    Hemoglobin A1C 5.6 0.0 - 5.6 %      Comment:      Normal <5.7%   Prediabetes 5.7-6.4%    Diabetes 6.5% or higher     Note: Adopted from ADA consensus guidelines.   Lipid Profile (Chol, Trig, HDL, LDL calc)   Result Value Ref Range    Cholesterol 200 (H) <200 mg/dL    Triglycerides 117 <150 mg/dL    Direct Measure HDL 56 >=40 mg/dL    LDL Cholesterol Calculated 121 (H) <=100 mg/dL    Non HDL Cholesterol 144 (H) <130 mg/dL    Patient Fasting > 8hrs? Yes     Narrative    Cholesterol  Desirable:  <200 mg/dL    Triglycerides  Normal:  Less than 150 mg/dL  Borderline High:  150-199 mg/dL  High:  200-499 mg/dL  Very High:  Greater than or equal to 500 mg/dL    Direct Measure HDL  Female:  Greater than or equal to 50 mg/dL   Male:  Greater than or equal to 40 mg/dL    LDL Cholesterol  Desirable:  <100mg/dL  Above Desirable:  100-129 mg/dL   Borderline High:  130-159 mg/dL   High:  160-189 mg/dL   Very High:  >= 190 mg/dL    Non HDL Cholesterol  Desirable:  130 mg/dL  Above Desirable:  130-159 mg/dL  Borderline High:  160-189 mg/dL  High:  190-219 mg/dL  Very High:  Greater than or equal to 220 mg/dL   CBC with platelets   Result Value Ref Range    WBC Count 5.4 4.0 - 11.0 10e3/uL    RBC Count 4.91 4.40 - 5.90 10e6/uL    Hemoglobin 14.7 13.3 - 17.7 g/dL    Hematocrit 45.0 40.0 - 53.0 %    MCV  92 78 - 100 fL    MCH 29.9 26.5 - 33.0 pg    MCHC 32.7 31.5 - 36.5 g/dL    RDW 13.0 10.0 - 15.0 %    Platelet Count 200 150 - 450 10e3/uL   PSA, screen   Result Value Ref Range    Prostate Specific Antigen Screen 2.91 0.00 - 6.50 ng/mL    Narrative    This result is obtained using the Roche Elecsys total PSA method on the symone e601 immunoassay analyzer. Results obtained with different assay methods or kits cannot be used interchangeably.       If you have any questions or concerns, please call the clinic at the number listed above.       Sincerely,      MADDIE Lay CNP/ ss

## 2024-04-22 NOTE — PROGRESS NOTES
Preventive Care Visit  [facility]  Franklyn Casey DNP,, MADDIE CNP, Family Medicine  Apr 22, 2024      Assessment & Plan     Encounter for Medicare annual wellness exam  Pleasant 74-year-old male in for annual Medicare wellness examination.  Chronic and acute conditions as below.    - CBC with platelets; Future    Hypertension goal BP (blood pressure) < 140/90  Chronic, stable blood pressure in office today. Continue medications without any changes.   - Basic metabolic panel  (Ca, Cl, CO2, Creat, Gluc, K, Na, BUN); Future  - lisinopril (ZESTRIL) 20 MG tablet; Take 1 tablet (20 mg) by mouth daily    Uncomplicated alcohol dependence (H)  Chronic, still drinking approximately 3-5 beers/day. No more than previous. Continue to encourage decreasing intake if able.   - CBC with platelets; Future    Elevated fasting glucose  Elevated fasting glucose last year, will check A1c.   - Hemoglobin A1c; Future    Urinary frequency  Screening for prostate cancer  Patient having increased urinary frequency over the last year, no pain or blood noted. No family history of prostate cancer, will check PSA.   - PSA, screen; Future    Acute rhinitis  Acute symptoms with headache's recently. Fluid noted in bilateral ears. Recommend starting an over the counter anti-histamine daily such as Claritin, Zyrtec or Allegra. If symptoms not improved in approximately 2 weeks, start over the counter Flonase nasal spray. 2 sprays each nostril daily. Point out towards eyes when administering. Continue for at least 3-4 weeks. If symptoms not improved see provider.     Hyperlipidemia LDL goal <160  Patient fasting today, recheck lipids.   - Lipid Profile (Chol, Trig, HDL, LDL calc); Future    Screen for colon cancer  Patient declines colon cancer screening.      Patient has been advised of split billing requirements and indicates understanding: Yes        BMI  Estimated body mass index is 31.98 kg/m  as calculated from the  "following:    Height as of this encounter: 1.651 m (5' 5\").    Weight as of this encounter: 87.2 kg (192 lb 3.2 oz).   Weight management plan: Discussed healthy diet and exercise guidelines    Counseling  Appropriate preventive services were discussed with this patient, including applicable screening as appropriate for fall prevention, nutrition, physical activity, Tobacco-use cessation, weight loss and cognition.  Checklist reviewing preventive services available has been given to the patient.  Reviewed patient's diet, addressing concerns and/or questions.   The patient was instructed to see the dentist every 6 months.   Discussed possible causes of fatigue. The patient reports drinking more than one alcoholic drink per day and sometimes engages in binge or excessive drinking. The patient was counseled and given information about possible harmful effects of excessive alcohol intake as well as where to get help for alcohol problems. The patient was provided with written information regarding signs of hearing loss.   Information on urinary incontinence and treatment options given to patient.       See Patient Instructions    Flora Dolan is a 74 year old, presenting for the following:  Physical        4/22/2024     7:52 AM   Additional Questions   Roomed by Nydia TENA CMA       Health Care Directive  Patient does not have a Health Care Directive or Living Will: Discussed advance care planning with patient; however, patient declined at this time.    HPI  Has been getting headaches off and on for a few weeks.  Unsure if it is related to allergies or sinus issues.  States he will get a headache for a few hours, but it usually goes away with Tylenol.  Headaches are mostly mild.  Started maybe last summer  May be a few days in between   Worse yesterday  Seems to be more in the morning, staying well hydrated   Has had runny nose; some post nasal drainage   Had nasal surgery in the distant past     Taking a nap now in " the afternoon     Increased urinary frequency with small amounts in the last year         4/22/2024   General Health   How would you rate your overall physical health? Good   Feel stress (tense, anxious, or unable to sleep) Not at all         4/22/2024   Nutrition   Diet: Regular (no restrictions)         4/22/2024   Exercise   Days per week of moderate/strenous exercise 7 days   Average minutes spent exercising at this level 30 min         4/22/2024   Social Factors   Frequency of gathering with friends or relatives Once a week   Worry food won't last until get money to buy more Patient declined   Food not last or not have enough money for food? Patient declined   Do you have housing?  Patient declined   Are you worried about losing your housing? Patient declined   Lack of transportation? Patient declined   Unable to get utilities (heat,electricity)? Patient declined         4/22/2024   Fall Risk   Fallen 2 or more times in the past year? No   Trouble with walking or balance? No          4/22/2024   Activities of Daily Living- Home Safety   Needs help with the following daily activites None of the above   Safety concerns in the home None of the above         4/22/2024   Dental   Dentist two times every year? (!) NO         4/22/2024   Hearing Screening   Hearing concerns? (!) I FEEL THAT PEOPLE ARE MUMBLING OR NOT SPEAKING CLEARLY.         4/22/2024   Driving Risk Screening   Patient/family members have concerns about driving No         4/22/2024   General Alertness/Fatigue Screening   Have you been more tired than usual lately? (!) YES         4/22/2024   Urinary Incontinence Screening   Bothered by leaking urine in past 6 months Yes         4/22/2024   TB Screening   Were you born outside of the [place]? No         Today's PHQ-2 Score:       4/22/2024     7:53 AM   PHQ-2 ( 1999 Pf[employer])   Q1: Little interest or pleasure in doing things 0   Q2: Feeling down, depressed or hopeless 0   PHQ-2 Score 0           4/22/2024    Substance Use   Alcohol more than 3/day or more than 7/wk Yes   How often do you have a drink containing alcohol 4 or more times a week   How many alcohol drinks on typical day 3 or 4   How often do you have 5+ drinks at one occasion Less than monthly   Audit 2/3 Score 2   How often not able to stop drinking once started Never   How often failed to do what normally expected Never   How often needed first drink in am after a heavy drinking session Never   How often feeling of guilt or remorse after drinking Never   How often unable to remember what happened the night before Never   Have you or someone else been injured because of your drinking No   Has anyone been concerned or suggested you cut down on drinking No   TOTAL SCORE - AUDIT 6   Do you have a current opioid prescription? No   How severe/bad is pain from 1 to 10? 3/10   Do you use any other substances recreationally? No     Social History     Tobacco Use    Smoking status: Former     Current packs/day: 0.00     Types: Cigarettes     Quit date: 2001     Years since quittin.3     Passive exposure: Never    Smokeless tobacco: Never   Vaping Use    Vaping status: Never Used   Substance Use Topics    Alcohol use: Yes     Alcohol/week: 28.0 standard drinks of alcohol     Types: 28 Cans of beer per week     Comment: 3-5 beers at night     Drug use: No         Still drinking 3-4 beers/day         2024   AAA Screening   Family history of Abdominal Aortic Aneurysm (AAA)? No   ASCVD Risk   The 10-year ASCVD risk score (Earlene DENIS, et al., 2019) is: 19.6%    Values used to calculate the score:      Age: 74 years      Sex: Male      Is Non- : No      Diabetic: No      Tobacco smoker: No      Systolic Blood Pressure: 112 mmHg      Is BP treated: Yes      HDL Cholesterol: 56 mg/dL      Total Cholesterol: 200 mg/dL            Reviewed and updated as needed this visit by Provider   Tobacco  Allergies  Meds  Problems  Med  "Hx  Surg Hx  Fam Hx            History reviewed. No pertinent past medical history.  History reviewed. No pertinent surgical history.  Current providers sharing in care for this patient include:  Patient Care Team:  Anju Antonio APRN CNP as PCP - General (Nurse Practitioner - Family)  Anju Antonio APRN CNP as Assigned PCP    The following health maintenance items are reviewed in Epic and correct as of today:  Health Maintenance   Topic Date Due    COLORECTAL CANCER SCREENING  Never done    ZOSTER IMMUNIZATION (1 of 2) Never done    RSV VACCINE (Pregnancy & 60+) (1 - 1-dose 60+ series) Never done    COVID-19 Vaccine (5 - 2023-24 season) 09/01/2023    MEDICARE ANNUAL WELLNESS VISIT  04/22/2025    LIPID  04/22/2025    ANNUAL REVIEW OF HM ORDERS  04/22/2025    FALL RISK ASSESSMENT  04/22/2025    DTAP/TDAP/TD IMMUNIZATION (2 - Td or Tdap) 12/19/2026    GLUCOSE  04/22/2027    ADVANCE CARE PLANNING  04/12/2028    HEPATITIS C SCREENING  Completed    PHQ-2 (once per calendar year)  Completed    INFLUENZA VACCINE  Completed    Pneumococcal Vaccine: 65+ Years  Completed    AORTIC ANEURYSM SCREENING (SYSTEM ASSIGNED)  Completed    IPV IMMUNIZATION  Aged Out    HPV IMMUNIZATION  Aged Out    MENINGITIS IMMUNIZATION  Aged Out    RSV MONOCLONAL ANTIBODY  Aged Out         Review of Systems  Constitutional, neuro, ENT, endocrine, pulmonary, cardiac, gastrointestinal, genitourinary, musculoskeletal, integument and psychiatric systems are negative, except as otherwise noted.     Objective    Exam  /76 (BP Location: Right arm, Patient Position: Sitting, Cuff Size: Adult Regular)   Pulse 63   Temp (!) 96.5  F (35.8  C) (Tympanic)   Resp 22   Ht 1.651 m (5' 5\")   Wt 87.2 kg (192 lb 3.2 oz)   SpO2 96%   BMI 31.98 kg/m     Estimated body mass index is 31.98 kg/m  as calculated from the following:    Height as of this encounter: 1.651 m (5' 5\").    Weight as of this encounter: 87.2 kg (192 lb 3.2 " oz).    Physical Exam  GENERAL: alert and no distress  EYES: Eyes grossly normal to inspection, PERRL and conjunctivae and sclerae normal  HENT: ear canals and TM's normal, nose and mouth without ulcers or lesions  NECK: no adenopathy, no asymmetry, masses, or scars  RESP: lungs clear to auscultation - no rales, rhonchi or wheezes  CV: regular rate and rhythm, normal S1 S2, no S3 or S4, no murmur, click or rub, no peripheral edema  ABDOMEN: soft, nontender, no hepatosplenomegaly, no masses and bowel sounds normal  MS: no gross musculoskeletal defects noted, no edema  SKIN: no suspicious lesions or rashes  NEURO: Normal strength and tone, mentation intact and speech normal  PSYCH: mentation appears normal, affect normal/bright         4/22/2024   Mini Cog   Clock Draw Score 2 Normal   3 Item Recall 3 objects recalled   Mini Cog Total Score 5              Signed Electronically by: Franklyn Casey DNP,, MADDIE CNP      Chart documentation with Dragon Voice recognition Software. Although reviewed after completion, some words and grammatical errors may remain.

## 2024-06-04 ENCOUNTER — TELEPHONE (OUTPATIENT)
Dept: FAMILY MEDICINE | Facility: CLINIC | Age: 74
End: 2024-06-04
Payer: COMMERCIAL

## 2024-06-04 NOTE — TELEPHONE ENCOUNTER
Patient Quality Outreach    Patient is due for the following:   Colon Cancer Screening    Next Steps:   No follow up needed at this time.    Type of outreach:    No outreach needed, was seen on 04/22/24 and declined colon cancer screening      Questions for provider review:    None           Renetta Barton, CMA

## 2024-10-29 NOTE — PATIENT INSTRUCTIONS
Would recommend you get a shingles vaccination    Would also highly recommend a colon cancer screening - let me know if you change your mind and want to get this done    Call the pharmacy when you need your refills filled    See me back in 1 year for routine exam and physical - will get lab work at that time      Patient Education   Personalized Prevention Plan  You are due for the preventive services outlined below.  Your care team is available to assist you in scheduling these services.  If you have already completed any of these items, please share that information with your care team to update in your medical record.  Health Maintenance Due   Topic Date Due     Colon Cancer Screening - every 10 years.  01/29/2001     Zoster (Shingles) Vaccine (1 of 2) 01/29/2001     Annual Wellness Visit  12/28/2018        Patient Education       gall baldder drain site-

## 2024-10-31 ENCOUNTER — IMMUNIZATION (OUTPATIENT)
Dept: FAMILY MEDICINE | Facility: CLINIC | Age: 74
End: 2024-10-31
Payer: COMMERCIAL

## 2024-10-31 DIAGNOSIS — Z23 NEED FOR INFLUENZA VACCINATION: Primary | ICD-10-CM

## 2024-10-31 PROCEDURE — G0008 ADMIN INFLUENZA VIRUS VAC: HCPCS

## 2024-10-31 PROCEDURE — 90662 IIV NO PRSV INCREASED AG IM: CPT

## 2024-10-31 NOTE — PROGRESS NOTES
Prior to immunization administration, verified patients identity using patient s name and date of birth. Please see Immunization Activity for additional information.     Screening Questionnaire for Adult Immunization    Are you sick today?   No   Do you have allergies to medications, food, a vaccine component or latex?   No   Have you ever had a serious reaction after receiving a vaccination?   No   Do you have a long-term health problem with heart, lung, kidney, or metabolic disease (e.g., diabetes), asthma, a blood disorder, no spleen, complement component deficiency, a cochlear implant, or a spinal fluid leak?  Are you on long-term aspirin therapy?   No   Do you have cancer, leukemia, HIV/AIDS, or any other immune system problem?   No   Do you have a parent, brother, or sister with an immune system problem?   No   In the past 3 months, have you taken medications that affect  your immune system, such as prednisone, other steroids, or anticancer drugs; drugs for the treatment of rheumatoid arthritis, Crohn s disease, or psoriasis; or have you had radiation treatments?   No   Have you had a seizure, or a brain or other nervous system problem?   No   During the past year, have you received a transfusion of blood or blood    products, or been given immune (gamma) globulin or antiviral drug?   No   For women: Are you pregnant or is there a chance you could become       pregnant during the next month?   No   Have you received any vaccinations in the past 4 weeks?   No     Immunization questionnaire answers were all negative.    I have reviewed the following standing orders:   This patient is due and qualifies for the Influenza vaccine.    Click here for Influenza Vaccine Standing Order    I have reviewed the vaccines inclusion and exclusion criteria; No concerns regarding eligibility.     Patient instructed to remain in clinic for 15 minutes afterwards, and to report any adverse reactions.     Screening performed by  Aurora Yu MA on 10/31/2024 at 9:28 AM.

## 2025-04-22 NOTE — PATIENT INSTRUCTIONS
Labs today ~ will call with results     Consider sleep study for fatigue     Schedule with dermatology for a skin check     See me back in 1 year for recheck or sooner if needed      Patient Education   Preventive Care Advice   This is general advice given by our system to help you stay healthy. However, your care team may have specific advice just for you. Please talk to your care team about your preventive care needs.  Nutrition  Eat 5 or more servings of fruits and vegetables each day.  Try wheat bread, brown rice and whole grain pasta (instead of white bread, rice, and pasta).  Get enough calcium and vitamin D. Check the label on foods and aim for 100% of the RDA (recommended daily allowance).  Lifestyle  Exercise at least 150 minutes each week  (30 minutes a day, 5 days a week).  Do muscle strengthening activities 2 days a week. These help control your weight and prevent disease.  No smoking.  Wear sunscreen to prevent skin cancer.  Have a dental exam and cleaning every 6 months.  Yearly exams  See your health care team every year to talk about:  Any changes in your health.  Any medicines your care team has prescribed.  Preventive care, family planning, and ways to prevent chronic diseases.  Shots (vaccines)   HPV shots (up to age 26), if you've never had them before.  Hepatitis B shots (up to age 59), if you've never had them before.  COVID-19 shot: Get this shot when it's due.  Flu shot: Get a flu shot every year.  Tetanus shot: Get a tetanus shot every 10 years.  Pneumococcal, hepatitis A, and RSV shots: Ask your care team if you need these based on your risk.  Shingles shot (for age 50 and up)  General health tests  Diabetes screening:  Starting at age 35, Get screened for diabetes at least every 3 years.  If you are younger than age 35, ask your care team if you should be screened for diabetes.  Cholesterol test: At age 39, start having a cholesterol test every 5 years, or more often if advised.  Bone  density scan (DEXA): At age 50, ask your care team if you should have this scan for osteoporosis (brittle bones).  Hepatitis C: Get tested at least once in your life.  STIs (sexually transmitted infections)  Before age 24: Ask your care team if you should be screened for STIs.  After age 24: Get screened for STIs if you're at risk. You are at risk for STIs (including HIV) if:  You are sexually active with more than one person.  You don't use condoms every time.  You or a partner was diagnosed with a sexually transmitted infection.  If you are at risk for HIV, ask about PrEP medicine to prevent HIV.  Get tested for HIV at least once in your life, whether you are at risk for HIV or not.  Cancer screening tests  Cervical cancer screening: If you have a cervix, begin getting regular cervical cancer screening tests starting at age 21.  Breast cancer scan (mammogram): If you've ever had breasts, begin having regular mammograms starting at age 40. This is a scan to check for breast cancer.  Colon cancer screening: It is important to start screening for colon cancer at age 45.  Have a colonoscopy test every 10 years (or more often if you're at risk) Or, ask your provider about stool tests like a FIT test every year or Cologuard test every 3 years.  To learn more about your testing options, visit:   .  For help making a decision, visit:   https://bit.ly/rd54563.  Prostate cancer screening test: If you have a prostate, ask your care team if a prostate cancer screening test (PSA) at age 55 is right for you.  Lung cancer screening: If you are a current or former smoker ages 50 to 80, ask your care team if ongoing lung cancer screenings are right for you.  For informational purposes only. Not to replace the advice of your health care provider. Copyright   2023 [employer]. All rights reserved. Clinically reviewed by the [facility]. [employer] 817179 - REV 01/24.

## 2025-04-23 ENCOUNTER — OFFICE VISIT (OUTPATIENT)
Dept: FAMILY MEDICINE | Facility: CLINIC | Age: 75
End: 2025-04-23
Payer: COMMERCIAL

## 2025-04-23 VITALS
BODY MASS INDEX: 30.86 KG/M2 | DIASTOLIC BLOOD PRESSURE: 78 MMHG | TEMPERATURE: 97.8 F | HEART RATE: 64 BPM | WEIGHT: 185.2 LBS | RESPIRATION RATE: 20 BRPM | HEIGHT: 65 IN | OXYGEN SATURATION: 98 % | SYSTOLIC BLOOD PRESSURE: 120 MMHG

## 2025-04-23 DIAGNOSIS — R53.83 OTHER FATIGUE: ICD-10-CM

## 2025-04-23 DIAGNOSIS — K13.0 CHAPPED LIPS: ICD-10-CM

## 2025-04-23 DIAGNOSIS — E78.5 HYPERLIPIDEMIA LDL GOAL <130: ICD-10-CM

## 2025-04-23 DIAGNOSIS — F10.20 UNCOMPLICATED ALCOHOL DEPENDENCE (H): ICD-10-CM

## 2025-04-23 DIAGNOSIS — L98.9 SKIN LESION: ICD-10-CM

## 2025-04-23 DIAGNOSIS — Z00.00 ENCOUNTER FOR MEDICARE ANNUAL WELLNESS EXAM: Primary | ICD-10-CM

## 2025-04-23 DIAGNOSIS — Z12.11 SCREEN FOR COLON CANCER: ICD-10-CM

## 2025-04-23 DIAGNOSIS — I10 HYPERTENSION GOAL BP (BLOOD PRESSURE) < 140/90: ICD-10-CM

## 2025-04-23 LAB
ANION GAP SERPL CALCULATED.3IONS-SCNC: 9 MMOL/L (ref 7–15)
BUN SERPL-MCNC: 18.3 MG/DL (ref 8–23)
CALCIUM SERPL-MCNC: 9.5 MG/DL (ref 8.8–10.4)
CHLORIDE SERPL-SCNC: 107 MMOL/L (ref 98–107)
CHOLEST SERPL-MCNC: 163 MG/DL
CREAT SERPL-MCNC: 0.79 MG/DL (ref 0.67–1.17)
EGFRCR SERPLBLD CKD-EPI 2021: >90 ML/MIN/1.73M2
ERYTHROCYTE [DISTWIDTH] IN BLOOD BY AUTOMATED COUNT: 13.1 % (ref 10–15)
FASTING STATUS PATIENT QL REPORTED: YES
FASTING STATUS PATIENT QL REPORTED: YES
GLUCOSE SERPL-MCNC: 102 MG/DL (ref 70–99)
HCO3 SERPL-SCNC: 23 MMOL/L (ref 22–29)
HCT VFR BLD AUTO: 42.5 % (ref 40–53)
HDLC SERPL-MCNC: 53 MG/DL
HGB BLD-MCNC: 13.9 G/DL (ref 13.3–17.7)
LDLC SERPL CALC-MCNC: 89 MG/DL
MCH RBC QN AUTO: 30 PG (ref 26.5–33)
MCHC RBC AUTO-ENTMCNC: 32.7 G/DL (ref 31.5–36.5)
MCV RBC AUTO: 92 FL (ref 78–100)
NONHDLC SERPL-MCNC: 110 MG/DL
PLATELET # BLD AUTO: 208 10E3/UL (ref 150–450)
POTASSIUM SERPL-SCNC: 4.4 MMOL/L (ref 3.4–5.3)
RBC # BLD AUTO: 4.63 10E6/UL (ref 4.4–5.9)
SODIUM SERPL-SCNC: 139 MMOL/L (ref 135–145)
TRIGL SERPL-MCNC: 107 MG/DL
VIT B12 SERPL-MCNC: 294 PG/ML (ref 232–1245)
WBC # BLD AUTO: 4.6 10E3/UL (ref 4–11)

## 2025-04-23 PROCEDURE — 3074F SYST BP LT 130 MM HG: CPT | Performed by: NURSE PRACTITIONER

## 2025-04-23 PROCEDURE — 99214 OFFICE O/P EST MOD 30 MIN: CPT | Mod: 25 | Performed by: NURSE PRACTITIONER

## 2025-04-23 PROCEDURE — 85027 COMPLETE CBC AUTOMATED: CPT | Performed by: NURSE PRACTITIONER

## 2025-04-23 PROCEDURE — G2211 COMPLEX E/M VISIT ADD ON: HCPCS | Performed by: NURSE PRACTITIONER

## 2025-04-23 PROCEDURE — 36415 COLL VENOUS BLD VENIPUNCTURE: CPT | Performed by: NURSE PRACTITIONER

## 2025-04-23 PROCEDURE — 82607 VITAMIN B-12: CPT | Performed by: NURSE PRACTITIONER

## 2025-04-23 PROCEDURE — G0439 PPPS, SUBSEQ VISIT: HCPCS | Performed by: NURSE PRACTITIONER

## 2025-04-23 PROCEDURE — 80061 LIPID PANEL: CPT | Performed by: NURSE PRACTITIONER

## 2025-04-23 PROCEDURE — 1125F AMNT PAIN NOTED PAIN PRSNT: CPT | Performed by: NURSE PRACTITIONER

## 2025-04-23 PROCEDURE — 3078F DIAST BP <80 MM HG: CPT | Performed by: NURSE PRACTITIONER

## 2025-04-23 PROCEDURE — 80048 BASIC METABOLIC PNL TOTAL CA: CPT | Performed by: NURSE PRACTITIONER

## 2025-04-23 RX ORDER — MUPIROCIN 20 MG/G
OINTMENT TOPICAL 3 TIMES DAILY
Qty: 15 G | Refills: 0 | Status: SHIPPED | OUTPATIENT
Start: 2025-04-23

## 2025-04-23 RX ORDER — LISINOPRIL 20 MG/1
20 TABLET ORAL DAILY
Qty: 90 TABLET | Refills: 3 | Status: SHIPPED | OUTPATIENT
Start: 2025-04-23

## 2025-04-23 ASSESSMENT — PAIN SCALES - GENERAL: PAINLEVEL_OUTOF10: MILD PAIN (2)

## 2025-04-23 NOTE — NURSING NOTE
"Chief Complaint   Patient presents with    Physical       Initial There were no vitals taken for this visit. Estimated body mass index is 31.98 kg/m  as calculated from the following:    Height as of 4/22/24: 1.651 m (5' 5\").    Weight as of 4/22/24: 87.2 kg (192 lb 3.2 oz).    Patient presents to the clinic using No DME    Is there anyone who you would like to be able to receive your results? No  If yes have patient fill out LASHAUN      "

## 2025-04-23 NOTE — PROGRESS NOTES
Preventive Care Visit  [facility]  Anju Estes, [place], Family Medicine  Apr 23, 2025      Assessment & Plan     Encounter for Medicare annual wellness exam  Very pleasant, relatively healthy 75-year-old male in for annual Medicare wellness examination.    Hypertension goal BP (blood pressure) < 140/90  Chronic, stable.  Blood pressure below target goal in office today.  Continue lisinopril without any changes and recheck metabolic panel.  - lisinopril (ZESTRIL) 20 MG tablet; Take 1 tablet (20 mg) by mouth daily.  - Basic metabolic panel  (Ca, Cl, CO2, Creat, Gluc, K, Na, BUN); Future    Uncomplicated alcohol dependence (H)  Chronic, still drinks approximately 3-4 beers a day.  No more than previous.  Continue to encourage decreasing.    Other fatigue  Patient reports slightly more fatigue throughout the day.  Sleeps well during the night, occasionally gets up in the middle the night to urinate.  Thinks he might snore, wife has mentioned previously.  Takes a vitamin D regularly.  Discussed the possibility of sleep apnea, discussed sleep study, patient declined at this time.  Recommend considering and notifying provider if wanting to order.  Will check vitamin B12 and CBC.  - Vitamin B12; Future  - CBC with platelets; Future    Skin lesion  Skin lesion on left back near armpit, slightly abnormal in color, size and texture.  Recommend dermatology referral for full skin check.  - Adult Dermatology  Referral; Future    Chapped lips  Intermittent chapped lips and cuts and scrapes patient uses Bactroban for which is very helpful.  Refilled.  - mupirocin (BACTROBAN) 2 % external ointment; Apply topically 3 times daily.    Hyperlipidemia LDL goal <130  Chronic, stable.  Patient is fasting, will recheck cholesterol levels today.  - Lipid Profile (Chol, Trig, HDL, LDL calc); Future    Screen for colon cancer  Patient declines any type of colon cancer screening, states he does not want to  "know.  Continue to encourage.    The longitudinal plan of care for the diagnosis(es)/condition(s) as documented were addressed during this visit. Due to the added complexity in care, I will continue to support Magdaleno in the subsequent management and with ongoing continuity of care.        BMI  Estimated body mass index is 31.23 kg/m  as calculated from the following:    Height as of this encounter: 1.64 m (5' 4.57\").    Weight as of this encounter: 84 kg (185 lb 3.2 oz).   Weight management plan: Discussed healthy diet and exercise guidelines      Follow-up    Follow-up Visit   Expected date:  Apr 29, 2026 (Approximate)      Follow Up Appointment Details:     Follow-up with whom?: PCP    Follow-Up for what?: Medicare Wellness    Welcome or Annual?: Annual Wellness    How?: In Person                 Subjective   Magdaleno is a 75 year old, presenting for the following:  Physical and Hypertension        4/23/2025     7:35 AM   Additional Questions   Roomed by Michelle Babin           HPI       Hypertension Follow-up    Do you check your blood pressure regularly outside of the clinic? No   Are you following a low salt diet? No  Are your blood pressures ever more than 140 on the top number (systolic) OR more   than 90 on the bottom number (diastolic), for example 140/90? N/A    BP Readings from Last 2 Encounters:   04/23/25 120/78   04/22/24 112/76     Sleeps well at night, several hours ~ but falls asleep at anytime during the day when sits down   Feels he sleeps well, may get up once at night to urinate. Does some snoring.     Still drinking approximately 3-4 beers/day  Occasionally takes a CBD pill for pain otherwise ibuprofen or tylenol. Generally takes a tylenol/ibuprofen at night for his aches/pains in shoulder  See's a chiropractor weekly       Advance Care Planning    Discussed advance care planning with patient; informed AVS has link to [employer].        4/22/2024   General Health   How would you rate your " overall physical health? Good   Feel stress (tense, anxious, or unable to sleep) Not at all         4/22/2024   Nutrition   Diet: Regular (no restrictions)         4/22/2024   Exercise   Days per week of moderate/strenous exercise 7 days   Average minutes spent exercising at this level 30 min         4/22/2024   Social Factors   Frequency of gathering with friends or relatives Once a week   Worry food won't last until get money to buy more Patient declined   Food not last or not have enough money for food? Patient declined   Do you have housing? (Housing is defined as stable permanent housing and does not include staying ouside in a car, in a tent, in an abandoned building, in an overnight shelter, or couch-surfing.) Patient declined   Are you worried about losing your housing? Patient declined   Lack of transportation? Patient declined   Unable to get utilities (heat,electricity)? Patient declined         4/22/2024   Activities of Daily Living- Home Safety   Needs help with the following daily activites None of the above   Safety concerns in the home None of the above         4/22/2024   Dental   Dentist two times every year? (!) NO         4/22/2024   Hearing Screening   Hearing concerns? (!) I FEEL THAT PEOPLE ARE MUMBLING OR NOT SPEAKING CLEARLY.     Has hearing aids and just can't wear them   Just took them back last week and getting ones that go on the outside of the ar        4/22/2024   Urinary Incontinence Screening   Bothered by leaking urine in past 6 months Yes     No change from last year  Maybe not as frequent   Not having to wear anything right now  Last PSA normal   Tried Prevagin         Today's PHQ-2 Score:       4/23/2025     7:36 AM   PHQ-2 ( 1999 [employer])   Q1: Little interest or pleasure in doing things 0   Q2: Feeling down, depressed or hopeless 0   PHQ-2 Score 0         4/22/2024   Substance Use   Alcohol more than 3/day or more than 7/wk Yes   How often do you have a drink containing alcohol  4 or more times a week   How many alcohol drinks on typical day 3 or 4   How often do you have 5+ drinks at one occasion Less than monthly   Audit 2/3 Score 2   How often not able to stop drinking once started Never   How often failed to do what normally expected Never   How often needed first drink in am after a heavy drinking session Never   How often feeling of guilt or remorse after drinking Never   How often unable to remember what happened the night before Never   Have you or someone else been injured because of your drinking No   Has anyone been concerned or suggested you cut down on drinking No   TOTAL SCORE - AUDIT 6   Do you have a current opioid prescription? No   How severe/bad is pain from 1 to 10? 3/10   Do you use any other substances recreationally? No     Social History     Tobacco Use    Smoking status: Former     Current packs/day: 0.00     Types: Cigarettes     Quit date: 2001     Years since quittin.3     Passive exposure: Never    Smokeless tobacco: Never   Vaping Use    Vaping status: Never Used   Substance Use Topics    Alcohol use: Yes     Alcohol/week: 28.0 standard drinks of alcohol     Types: 28 Cans of beer per week     Comment: 3-5 beers at night     Drug use: No       ASCVD Risk   The 10-year ASCVD risk score (Earlene DENIS, et al., 2019) is: 23.3%    Values used to calculate the score:      Age: 75 years      Sex: Male      Is Non- : No      Diabetic: No      Tobacco smoker: No      Systolic Blood Pressure: 120 mmHg      Is BP treated: Yes      HDL Cholesterol: 56 mg/dL      Total Cholesterol: 200 mg/dL    Fracture Risk Assessment Tool  Link to Frax Calculator  Use the information below to complete the Frax calculator  : 1950  Sex: male  Weight (kg): 84 kg (actual weight)  Height (cm): 164 cm  Previous Fragility Fracture:  No  History of parent with fractured hip:  No  Current Smoking:  No  Patient has been on glucocorticoids for more than  "3 months (5mg/day or more): No  Rheumatoid Arthritis on Problem List:  No  Secondary Osteoporosis on Problem List:  No  Consumes 3 or more units of alcohol per day: Yes  Femoral Neck BMD (g/cm2)            Reviewed and updated as needed this visit by Provider                    History reviewed. No pertinent past medical history.  History reviewed. No pertinent surgical history.  Current providers sharing in care for this patient include:  Patient Care Team:  Anju Antonio DNP as PCP - General (Nurse Practitioner - Family)  Anju Antonio DNP as Assigned PCP    The following health maintenance items are reviewed in Epic and correct as of today:  Health Maintenance   Topic Date Due    COLORECTAL CANCER SCREENING  Never done    LUNG CANCER SCREENING  Never done    COVID-19 Vaccine (5 - 2024-25 season) 09/01/2024    BMP  04/22/2025    LIPID  04/22/2025    ANNUAL REVIEW OF HM ORDERS  04/22/2025    FALL RISK ASSESSMENT  04/22/2025    RSV VACCINE (1 - 1-dose 75+ series) 01/29/2026    MEDICARE ANNUAL WELLNESS VISIT  04/23/2026    DTAP/TDAP/TD IMMUNIZATION (2 - Td or Tdap) 12/19/2026    DIABETES SCREENING  04/22/2027    ADVANCE CARE PLANNING  04/23/2030    HEPATITIS C SCREENING  Completed    PHQ-2 (once per calendar year)  Completed    INFLUENZA VACCINE  Completed    Pneumococcal Vaccine: 50+ Years  Completed    ZOSTER IMMUNIZATION  Completed    HPV IMMUNIZATION  Aged Out    MENINGITIS IMMUNIZATION  Aged Out         Review of Systems  Constitutional, neuro, ENT, endocrine, pulmonary, cardiac, gastrointestinal, genitourinary, musculoskeletal, integument and psychiatric systems are negative, except as otherwise noted.     Objective    Exam  /78   Pulse 64   Temp 97.8  F (36.6  C) (Tympanic)   Resp 20   Ht 1.64 m (5' 4.57\")   Wt 84 kg (185 lb 3.2 oz)   SpO2 98%   BMI 31.23 kg/m     Estimated body mass index is 31.23 kg/m  as calculated from the following:    Height as of this encounter: 1.64 m (5' " "4.57\").    Weight as of this encounter: 84 kg (185 lb 3.2 oz).    Physical Exam  GENERAL: alert and no distress  EYES: Eyes grossly normal to inspection, PERRL and conjunctivae and sclerae normal  HENT: ear canals and TM's normal, nose and mouth without ulcers or lesions  NECK: no adenopathy, no asymmetry, masses, or scars  RESP: lungs clear to auscultation - no rales, rhonchi or wheezes  CV: regular rate and rhythm, normal S1 S2, no S3 or S4, no murmur, click or rub, no peripheral edema  ABDOMEN: soft, nontender, no hepatosplenomegaly, no masses and bowel sounds normal  MS: no gross musculoskeletal defects noted, no edema  SKIN: has a thick leathery brown lesion with a stuck on appearance about on mid back and somewhat oblong, brown raised lesion on left posterior axillary region   NEURO: Normal strength and tone, mentation intact and speech normal  PSYCH: mentation appears normal, affect normal/bright         4/23/2025   Mini Cog   Clock Draw Score 2 Normal   3 Item Recall 3 objects recalled   Mini Cog Total Score 5              Signed Electronically by: Anju Estes, DNP      Chart documentation with Dragon Voice recognition Software. Although reviewed after completion, some words and grammatical errors may remain.   "